# Patient Record
Sex: MALE | Race: WHITE | Employment: FULL TIME | URBAN - NONMETROPOLITAN AREA
[De-identification: names, ages, dates, MRNs, and addresses within clinical notes are randomized per-mention and may not be internally consistent; named-entity substitution may affect disease eponyms.]

---

## 2017-08-08 ENCOUNTER — OFFICE VISIT (OUTPATIENT)
Dept: FAMILY MEDICINE CLINIC | Age: 52
End: 2017-08-08
Payer: COMMERCIAL

## 2017-08-08 VITALS
BODY MASS INDEX: 31.34 KG/M2 | TEMPERATURE: 98 F | RESPIRATION RATE: 18 BRPM | HEART RATE: 74 BPM | DIASTOLIC BLOOD PRESSURE: 78 MMHG | WEIGHT: 195 LBS | SYSTOLIC BLOOD PRESSURE: 112 MMHG | OXYGEN SATURATION: 96 % | HEIGHT: 66 IN

## 2017-08-08 DIAGNOSIS — M48.02 CERVICAL SPINAL STENOSIS: ICD-10-CM

## 2017-08-08 DIAGNOSIS — E78.01 FAMILIAL HYPERCHOLESTEREMIA: ICD-10-CM

## 2017-08-08 DIAGNOSIS — I10 ESSENTIAL (PRIMARY) HYPERTENSION: Primary | ICD-10-CM

## 2017-08-08 DIAGNOSIS — Z00.00 ANNUAL PHYSICAL EXAM: ICD-10-CM

## 2017-08-08 DIAGNOSIS — M72.2 PLANTAR FASCIITIS, BILATERAL: ICD-10-CM

## 2017-08-08 PROCEDURE — 99213 OFFICE O/P EST LOW 20 MIN: CPT | Performed by: FAMILY MEDICINE

## 2017-08-08 RX ORDER — LISINOPRIL 10 MG/1
10 TABLET ORAL DAILY
COMMUNITY
End: 2018-03-15 | Stop reason: SDUPTHER

## 2017-08-08 RX ORDER — MELOXICAM 15 MG/1
15 TABLET ORAL DAILY
COMMUNITY
End: 2018-07-10 | Stop reason: SDUPTHER

## 2017-08-08 RX ORDER — PRAVASTATIN SODIUM 20 MG
20 TABLET ORAL DAILY
COMMUNITY
End: 2018-02-19 | Stop reason: ALTCHOICE

## 2017-08-15 PROBLEM — M72.2 PLANTAR FASCIITIS, BILATERAL: Status: ACTIVE | Noted: 2017-08-15

## 2017-08-15 ASSESSMENT — ENCOUNTER SYMPTOMS
CONSTIPATION: 0
SHORTNESS OF BREATH: 0
COUGH: 0
CHEST TIGHTNESS: 0
ABDOMINAL PAIN: 0
DIARRHEA: 0
NAUSEA: 0
ANAL BLEEDING: 0

## 2018-02-01 DIAGNOSIS — Z00.00 ANNUAL PHYSICAL EXAM: ICD-10-CM

## 2018-02-01 LAB
ALBUMIN SERPL-MCNC: 4.5 G/DL (ref 3.5–5.2)
ALP BLD-CCNC: 54 U/L (ref 40–130)
ALT SERPL-CCNC: 33 U/L (ref 5–41)
ANION GAP SERPL CALCULATED.3IONS-SCNC: 16 MMOL/L (ref 7–19)
AST SERPL-CCNC: 32 U/L (ref 5–40)
BILIRUB SERPL-MCNC: 0.4 MG/DL (ref 0.2–1.2)
BUN BLDV-MCNC: 11 MG/DL (ref 6–20)
CALCIUM SERPL-MCNC: 9.9 MG/DL (ref 8.6–10)
CHLORIDE BLD-SCNC: 100 MMOL/L (ref 98–111)
CHOLESTEROL, TOTAL: 254 MG/DL (ref 160–199)
CO2: 25 MMOL/L (ref 22–29)
CREAT SERPL-MCNC: 0.8 MG/DL (ref 0.5–1.2)
GFR NON-AFRICAN AMERICAN: >60
GLUCOSE BLD-MCNC: 129 MG/DL (ref 74–109)
HDLC SERPL-MCNC: 45 MG/DL (ref 55–121)
LDL CHOLESTEROL CALCULATED: 153 MG/DL
POTASSIUM SERPL-SCNC: 4.2 MMOL/L (ref 3.5–5)
PROSTATE SPECIFIC ANTIGEN: 0.6 NG/ML (ref 0–4)
SODIUM BLD-SCNC: 141 MMOL/L (ref 136–145)
TOTAL PROTEIN: 7.9 G/DL (ref 6.6–8.7)
TRIGL SERPL-MCNC: 278 MG/DL (ref 0–149)

## 2018-02-19 ENCOUNTER — OFFICE VISIT (OUTPATIENT)
Dept: FAMILY MEDICINE CLINIC | Age: 53
End: 2018-02-19
Payer: COMMERCIAL

## 2018-02-19 VITALS
SYSTOLIC BLOOD PRESSURE: 120 MMHG | HEIGHT: 66 IN | BODY MASS INDEX: 36.22 KG/M2 | HEART RATE: 92 BPM | OXYGEN SATURATION: 96 % | TEMPERATURE: 97.3 F | DIASTOLIC BLOOD PRESSURE: 68 MMHG | WEIGHT: 225.38 LBS

## 2018-02-19 DIAGNOSIS — B34.9 VIRAL ILLNESS: ICD-10-CM

## 2018-02-19 DIAGNOSIS — E78.01 FAMILIAL HYPERCHOLESTEREMIA: ICD-10-CM

## 2018-02-19 DIAGNOSIS — I10 ESSENTIAL (PRIMARY) HYPERTENSION: Primary | ICD-10-CM

## 2018-02-19 PROCEDURE — 99396 PREV VISIT EST AGE 40-64: CPT | Performed by: NURSE PRACTITIONER

## 2018-02-19 RX ORDER — ROSUVASTATIN CALCIUM 5 MG/1
5 TABLET, COATED ORAL DAILY
Qty: 30 TABLET | Refills: 5 | Status: SHIPPED | OUTPATIENT
Start: 2018-02-19 | End: 2019-04-12 | Stop reason: SDUPTHER

## 2018-02-19 RX ORDER — CHLORAL HYDRATE 500 MG
1000 CAPSULE ORAL DAILY
COMMUNITY
End: 2022-05-09 | Stop reason: ALTCHOICE

## 2018-02-19 ASSESSMENT — ENCOUNTER SYMPTOMS
ABDOMINAL PAIN: 0
SHORTNESS OF BREATH: 0
CHEST TIGHTNESS: 0
SORE THROAT: 0
NAUSEA: 0
DIARRHEA: 0
COUGH: 1
WHEEZING: 0

## 2018-02-19 ASSESSMENT — PATIENT HEALTH QUESTIONNAIRE - PHQ9
1. LITTLE INTEREST OR PLEASURE IN DOING THINGS: 0
SUM OF ALL RESPONSES TO PHQ QUESTIONS 1-9: 0
SUM OF ALL RESPONSES TO PHQ9 QUESTIONS 1 & 2: 0
2. FEELING DOWN, DEPRESSED OR HOPELESS: 0

## 2018-08-14 DIAGNOSIS — E78.01 FAMILIAL HYPERCHOLESTEREMIA: ICD-10-CM

## 2018-08-14 LAB
ALBUMIN SERPL-MCNC: 4.3 G/DL (ref 3.5–5.2)
ALP BLD-CCNC: 49 U/L (ref 40–130)
ALT SERPL-CCNC: 31 U/L (ref 5–41)
ANION GAP SERPL CALCULATED.3IONS-SCNC: 17 MMOL/L (ref 7–19)
AST SERPL-CCNC: 31 U/L (ref 5–40)
BILIRUB SERPL-MCNC: 0.6 MG/DL (ref 0.2–1.2)
BUN BLDV-MCNC: 11 MG/DL (ref 6–20)
CALCIUM SERPL-MCNC: 9.1 MG/DL (ref 8.6–10)
CHLORIDE BLD-SCNC: 101 MMOL/L (ref 98–111)
CHOLESTEROL, TOTAL: 166 MG/DL (ref 160–199)
CO2: 23 MMOL/L (ref 22–29)
CREAT SERPL-MCNC: 0.8 MG/DL (ref 0.5–1.2)
GFR NON-AFRICAN AMERICAN: >60
GLUCOSE BLD-MCNC: 91 MG/DL (ref 74–109)
HDLC SERPL-MCNC: 41 MG/DL (ref 55–121)
LDL CHOLESTEROL CALCULATED: 90 MG/DL
POTASSIUM SERPL-SCNC: 4 MMOL/L (ref 3.5–5)
SODIUM BLD-SCNC: 141 MMOL/L (ref 136–145)
TOTAL PROTEIN: 7.2 G/DL (ref 6.6–8.7)
TRIGL SERPL-MCNC: 177 MG/DL (ref 0–149)

## 2018-08-20 ENCOUNTER — OFFICE VISIT (OUTPATIENT)
Dept: FAMILY MEDICINE CLINIC | Age: 53
End: 2018-08-20
Payer: COMMERCIAL

## 2018-08-20 VITALS
HEART RATE: 72 BPM | SYSTOLIC BLOOD PRESSURE: 128 MMHG | TEMPERATURE: 98.1 F | BODY MASS INDEX: 35.91 KG/M2 | OXYGEN SATURATION: 98 % | WEIGHT: 222.5 LBS | RESPIRATION RATE: 18 BRPM | DIASTOLIC BLOOD PRESSURE: 80 MMHG

## 2018-08-20 DIAGNOSIS — Z12.11 ENCOUNTER FOR SCREENING COLONOSCOPY: ICD-10-CM

## 2018-08-20 DIAGNOSIS — Z12.5 ENCOUNTER FOR PROSTATE CANCER SCREENING: ICD-10-CM

## 2018-08-20 DIAGNOSIS — I10 ESSENTIAL (PRIMARY) HYPERTENSION: ICD-10-CM

## 2018-08-20 DIAGNOSIS — E78.01 FAMILIAL HYPERCHOLESTEREMIA: Primary | ICD-10-CM

## 2018-08-20 PROCEDURE — G8417 CALC BMI ABV UP PARAM F/U: HCPCS | Performed by: FAMILY MEDICINE

## 2018-08-20 PROCEDURE — 99213 OFFICE O/P EST LOW 20 MIN: CPT | Performed by: FAMILY MEDICINE

## 2018-08-20 PROCEDURE — 3017F COLORECTAL CA SCREEN DOC REV: CPT | Performed by: FAMILY MEDICINE

## 2018-08-20 PROCEDURE — 1036F TOBACCO NON-USER: CPT | Performed by: FAMILY MEDICINE

## 2018-08-20 PROCEDURE — G8427 DOCREV CUR MEDS BY ELIG CLIN: HCPCS | Performed by: FAMILY MEDICINE

## 2018-08-20 RX ORDER — HYDROCODONE BITARTRATE AND ACETAMINOPHEN 7.5; 325 MG/1; MG/1
TABLET ORAL
COMMUNITY
Start: 2018-08-13 | End: 2021-04-29 | Stop reason: SDUPTHER

## 2018-08-20 ASSESSMENT — PATIENT HEALTH QUESTIONNAIRE - PHQ9
2. FEELING DOWN, DEPRESSED OR HOPELESS: 0
SUM OF ALL RESPONSES TO PHQ QUESTIONS 1-9: 0
SUM OF ALL RESPONSES TO PHQ9 QUESTIONS 1 & 2: 0
1. LITTLE INTEREST OR PLEASURE IN DOING THINGS: 0
SUM OF ALL RESPONSES TO PHQ QUESTIONS 1-9: 0

## 2018-08-20 NOTE — PROGRESS NOTES
Discussed use, benefit, and side effects of prescribed medications. All patient questions answered. Pt voiced understanding. Reviewed health maintenance. Instructed to continue current medications, diet and exercise. Patient agreed with treatment plan. Follow up as directed.

## 2018-10-11 ENCOUNTER — TELEPHONE (OUTPATIENT)
Dept: GASTROENTEROLOGY | Age: 53
End: 2018-10-11

## 2019-02-08 DIAGNOSIS — I10 ESSENTIAL (PRIMARY) HYPERTENSION: ICD-10-CM

## 2019-02-08 DIAGNOSIS — E78.01 FAMILIAL HYPERCHOLESTEREMIA: ICD-10-CM

## 2019-02-08 DIAGNOSIS — Z12.5 ENCOUNTER FOR PROSTATE CANCER SCREENING: ICD-10-CM

## 2019-02-08 LAB
ALBUMIN SERPL-MCNC: 4.6 G/DL (ref 3.5–5.2)
ALP BLD-CCNC: 62 U/L (ref 40–130)
ALT SERPL-CCNC: 38 U/L (ref 5–41)
ANION GAP SERPL CALCULATED.3IONS-SCNC: 16 MMOL/L (ref 7–19)
AST SERPL-CCNC: 33 U/L (ref 5–40)
BILIRUB SERPL-MCNC: 0.4 MG/DL (ref 0.2–1.2)
BUN BLDV-MCNC: 11 MG/DL (ref 6–20)
CALCIUM SERPL-MCNC: 9.7 MG/DL (ref 8.6–10)
CHLORIDE BLD-SCNC: 104 MMOL/L (ref 98–111)
CHOLESTEROL, TOTAL: 167 MG/DL (ref 160–199)
CO2: 23 MMOL/L (ref 22–29)
CREAT SERPL-MCNC: 0.8 MG/DL (ref 0.5–1.2)
GFR NON-AFRICAN AMERICAN: >60
GLUCOSE BLD-MCNC: 109 MG/DL (ref 74–109)
HDLC SERPL-MCNC: 44 MG/DL (ref 55–121)
LDL CHOLESTEROL CALCULATED: 99 MG/DL
POTASSIUM SERPL-SCNC: 4.1 MMOL/L (ref 3.5–5)
PROSTATE SPECIFIC ANTIGEN: 1.58 NG/ML (ref 0–4)
SODIUM BLD-SCNC: 143 MMOL/L (ref 136–145)
TOTAL PROTEIN: 8 G/DL (ref 6.6–8.7)
TRIGL SERPL-MCNC: 119 MG/DL (ref 0–149)

## 2019-02-21 ENCOUNTER — OFFICE VISIT (OUTPATIENT)
Dept: FAMILY MEDICINE CLINIC | Age: 54
End: 2019-02-21
Payer: COMMERCIAL

## 2019-02-21 VITALS
HEIGHT: 67 IN | HEART RATE: 68 BPM | OXYGEN SATURATION: 98 % | WEIGHT: 232 LBS | BODY MASS INDEX: 36.41 KG/M2 | SYSTOLIC BLOOD PRESSURE: 130 MMHG | DIASTOLIC BLOOD PRESSURE: 72 MMHG | TEMPERATURE: 99 F

## 2019-02-21 DIAGNOSIS — E78.01 FAMILIAL HYPERCHOLESTEROLEMIA: ICD-10-CM

## 2019-02-21 DIAGNOSIS — I10 ESSENTIAL (PRIMARY) HYPERTENSION: ICD-10-CM

## 2019-02-21 DIAGNOSIS — Z00.00 ANNUAL PHYSICAL EXAM: Primary | ICD-10-CM

## 2019-02-21 PROCEDURE — G8484 FLU IMMUNIZE NO ADMIN: HCPCS | Performed by: FAMILY MEDICINE

## 2019-02-21 PROCEDURE — 99396 PREV VISIT EST AGE 40-64: CPT | Performed by: FAMILY MEDICINE

## 2019-02-21 ASSESSMENT — ENCOUNTER SYMPTOMS
NAUSEA: 0
ANAL BLEEDING: 0
CONSTIPATION: 0
CHEST TIGHTNESS: 0
COUGH: 0
SHORTNESS OF BREATH: 0
ABDOMINAL PAIN: 0
DIARRHEA: 0

## 2019-04-12 RX ORDER — ROSUVASTATIN CALCIUM 5 MG/1
TABLET, COATED ORAL
Qty: 30 TABLET | Refills: 5 | Status: SHIPPED | OUTPATIENT
Start: 2019-04-12 | End: 2019-07-22 | Stop reason: SDUPTHER

## 2019-04-12 RX ORDER — LISINOPRIL 10 MG/1
TABLET ORAL
Qty: 90 TABLET | Refills: 0 | Status: SHIPPED | OUTPATIENT
Start: 2019-04-12 | End: 2019-07-22 | Stop reason: SDUPTHER

## 2019-06-18 ENCOUNTER — ANESTHESIA EVENT (OUTPATIENT)
Dept: OPERATING ROOM | Age: 54
End: 2019-06-18

## 2019-06-19 ENCOUNTER — ANESTHESIA (OUTPATIENT)
Dept: OPERATING ROOM | Age: 54
End: 2019-06-19

## 2019-06-19 ENCOUNTER — APPOINTMENT (OUTPATIENT)
Dept: OPERATING ROOM | Age: 54
End: 2019-06-19

## 2019-06-19 ENCOUNTER — HOSPITAL ENCOUNTER (OUTPATIENT)
Age: 54
Setting detail: OUTPATIENT SURGERY
Discharge: HOME | End: 2019-06-19
Attending: INTERNAL MEDICINE | Admitting: INTERNAL MEDICINE
Payer: COMMERCIAL

## 2019-06-19 VITALS — DIASTOLIC BLOOD PRESSURE: 95 MMHG | OXYGEN SATURATION: 97 % | SYSTOLIC BLOOD PRESSURE: 129 MMHG

## 2019-06-19 VITALS
BODY MASS INDEX: 36.96 KG/M2 | OXYGEN SATURATION: 98 % | WEIGHT: 230 LBS | HEART RATE: 79 BPM | SYSTOLIC BLOOD PRESSURE: 128 MMHG | RESPIRATION RATE: 18 BRPM | DIASTOLIC BLOOD PRESSURE: 74 MMHG | TEMPERATURE: 98.1 F | HEIGHT: 66 IN

## 2019-06-19 PROCEDURE — G8918 PT W/O PREOP ORDER IV AB PRO: HCPCS

## 2019-06-19 PROCEDURE — 45378 DIAGNOSTIC COLONOSCOPY: CPT | Performed by: INTERNAL MEDICINE

## 2019-06-19 PROCEDURE — G0105 COLORECTAL SCRN; HI RISK IND: HCPCS

## 2019-06-19 PROCEDURE — G8907 PT DOC NO EVENTS ON DISCHARG: HCPCS

## 2019-06-19 RX ORDER — MIDAZOLAM HYDROCHLORIDE 1 MG/ML
INJECTION INTRAMUSCULAR; INTRAVENOUS PRN
Status: DISCONTINUED | OUTPATIENT
Start: 2019-06-19 | End: 2019-06-19 | Stop reason: SDUPTHER

## 2019-06-19 RX ORDER — SODIUM CHLORIDE 9 MG/ML
INJECTION, SOLUTION INTRAVENOUS CONTINUOUS PRN
Status: DISCONTINUED | OUTPATIENT
Start: 2019-06-19 | End: 2019-06-19 | Stop reason: SDUPTHER

## 2019-06-19 RX ORDER — PROPOFOL 10 MG/ML
INJECTION, EMULSION INTRAVENOUS PRN
Status: DISCONTINUED | OUTPATIENT
Start: 2019-06-19 | End: 2019-06-19 | Stop reason: SDUPTHER

## 2019-06-19 RX ORDER — LIDOCAINE HYDROCHLORIDE 10 MG/ML
INJECTION, SOLUTION INFILTRATION; PERINEURAL PRN
Status: DISCONTINUED | OUTPATIENT
Start: 2019-06-19 | End: 2019-06-19 | Stop reason: SDUPTHER

## 2019-06-19 RX ORDER — SODIUM CHLORIDE 9 MG/ML
INJECTION, SOLUTION INTRAVENOUS CONTINUOUS
Status: DISCONTINUED | OUTPATIENT
Start: 2019-06-19 | End: 2019-06-19 | Stop reason: HOSPADM

## 2019-06-19 RX ADMIN — PROPOFOL 250 MG: 10 INJECTION, EMULSION INTRAVENOUS at 09:46

## 2019-06-19 RX ADMIN — SODIUM CHLORIDE: 9 INJECTION, SOLUTION INTRAVENOUS at 09:27

## 2019-06-19 RX ADMIN — MIDAZOLAM HYDROCHLORIDE 2 MG: 1 INJECTION INTRAMUSCULAR; INTRAVENOUS at 09:46

## 2019-06-19 RX ADMIN — LIDOCAINE HYDROCHLORIDE 40 MG: 10 INJECTION, SOLUTION INFILTRATION; PERINEURAL at 09:46

## 2019-06-19 RX ADMIN — SODIUM CHLORIDE: 9 INJECTION, SOLUTION INTRAVENOUS at 09:39

## 2019-06-19 ASSESSMENT — LIFESTYLE VARIABLES: SMOKING_STATUS: 0

## 2019-06-19 ASSESSMENT — PAIN - FUNCTIONAL ASSESSMENT: PAIN_FUNCTIONAL_ASSESSMENT: 0-10

## 2019-06-19 NOTE — ANESTHESIA POSTPROCEDURE EVALUATION
Department of Anesthesiology  Postprocedure Note    Patient: Sahil Dominguez  MRN: [id number]  YOB: 1965  Date of evaluation: 6/19/2019  Time:  10:00 AM     Procedure Summary     Date:  06/19/19 Room / Location:  [facility] ENDO 01 / [facility] OR    Anesthesia Start:  5276 Anesthesia Stop:      Procedure:  COLORECTAL CANCER SCREENING, NOT HIGH RISK (N/A Abdomen) Diagnosis:  (SCREEN - FH CLN POLYPS)    Surgeon:  Cory Molina MD Responsible Provider:  TESSY Choudhury CRNA    Anesthesia Type:  general, TIVA ASA Status:  2          Anesthesia Type: No value filed. Maryann Phase I:      Maryann Phase II:      Last vitals: Reviewed and per EMR flowsheets.        Anesthesia Post Evaluation    Patient location during evaluation: bedside  Patient participation: complete - patient participated  Level of consciousness: sleepy but conscious  Pain score: 0  Airway patency: patent  Nausea & Vomiting: no nausea and no vomiting  Complications: no  Cardiovascular status: hemodynamically stable and blood pressure returned to baseline  Respiratory status: acceptable and nasal cannula  Hydration status: stable

## 2019-06-19 NOTE — H&P
Patient Name: Will Mackey  : 1965  MRN: [id number]  DATE: 19    Allergies: No Known Allergies     ENDOSCOPY  History and Physical    Procedure:    [] Diagnostic Colonoscopy       [x] Screening Colonoscopy  [] EGD      [] ERCP      [] EUS       [] Other    [x] Previous office notes/History and Physical reviewed from the patients chart. Please see EMR for further details of HPI. I have examined the patient's status immediately prior to the procedure and:      Indications/HPI:    []Abdominal Pain   []Cancer- GI/Lung     []Fhx of colon CA/polyps  []History of Polyps  []Barretts            []Melena  []Abnormal Imaging              []Dysphagia              []Persistent Pneumonia   []Anemia                            []Food Impaction        []History of Polyps  [] GI Bleed             []Pulmonary nodule/Mass   []Change in bowel habits []Heartburn/Reflux  []Rectal Bleed (BRBPR)  []Chest Pain - Non Cardiac []Heme (+) Stool []Ulcers  []Constipation  []Hemoptysis  []Varices  []Diarrhea  []Hypoxemia    []Nausea/Vomiting   [x]Screening   []Crohns/Colitis  []Other:     Anesthesia:   [x] MAC [] Moderate Sedation   [] General   [] None     ROS: 12 pt Review of Symptoms was negative unless mentioned above    Medications:   Prior to Admission medications    Medication Sig Start Date End Date Taking?  Authorizing Provider   lisinopril (PRINIVIL;ZESTRIL) 10 MG tablet TAKE (1) TABLET DAILY FOR HIGH BLOOD PRESSURE. 19  Yes Lor Mistry MD   rosuvastatin (CRESTOR) 5 MG tablet TAKE 1 TABLET BY MOUTH ONCE DAILY 19  Yes Jewel Bilberry, APRN   meloxicam (MOBIC) 15 MG tablet TAKE 1 TABLET BY MOUTH ONCE DAILY 18  Yes Lor Mistry MD   HYDROcodone-acetaminophen [facility]) 7.5-325 MG per tablet  18   Historical Provider, MD   Omega-3 Fatty Acids (FISH OIL) 1000 MG CAPS Take 1,000 mg by mouth daily    Historical Provider, MD       Past Medical History:  Past Medical History:   Diagnosis Date  Hypertension        Past Surgical History:  Past Surgical History:   Procedure Laterality Date    FOOT NEUROMA SURGERY Left 2013       Social History:  Social History     Tobacco Use    Smoking status: Never Smoker    Smokeless tobacco: Never Used   Substance Use Topics    Alcohol use: Yes     Comment: social    Drug use: No       Vital Signs:   Vitals:    06/19/19 0903   BP: 119/77   Pulse: 84   Resp: 20   Temp: 98.1 °F (36.7 °C)   SpO2: 97%        Physical Exam:  Cardiac:  [x]WNL  []Comments:  Pulmonary:  [x]WNL   []Comments:  Neuro/Mental Status:  [x]WNL  []Comments:  Abdominal:  [x]WNL    []Comments:  Other:   []WNL  []Comments:    Informed Consent:  The risks and benefits of the procedure have been discussed with either the patient or if they cannot consent, their representative. Assessment:  Patient examined and appropriate for planned sedation and procedure. Plan:  Proceed with planned sedation and procedure as above.          Roddy Saleh MD

## 2019-06-19 NOTE — ANESTHESIA PRE PROCEDURE
Department of Anesthesiology  Preprocedure Note       Name:  Demetra Lundborg   Age:  48 y.o.  :  1965                                          MRN:  [id number]         Date:  2019      Surgeon: Sherry Valladares):  Sandra Garcia MD    Procedure: COLORECTAL CANCER SCREENING, NOT HIGH RISK (N/A Abdomen)    Medications prior to admission:   Prior to Admission medications    Medication Sig Start Date End Date Taking?  Authorizing Provider   lisinopril (PRINIVIL;ZESTRIL) 10 MG tablet TAKE (1) TABLET DAILY FOR HIGH BLOOD PRESSURE. 19  Yes Yovany Salgado MD   rosuvastatin (CRESTOR) 5 MG tablet TAKE 1 TABLET BY MOUTH ONCE DAILY 19  Yes TESSY Paulino   meloxicam (MOBIC) 15 MG tablet TAKE 1 TABLET BY MOUTH ONCE DAILY 18  Yes Yovany Salgado MD   HYDROcodone-acetaminophen [facility]) 7.5-325 MG per tablet  18   Historical Provider, MD   Omega-3 Fatty Acids (FISH OIL) 1000 MG CAPS Take 1,000 mg by mouth daily    Historical Provider, MD       Current medications:    Current Facility-Administered Medications   Medication Dose Route Frequency Provider Last Rate Last Dose    0.9 % sodium chloride infusion   Intravenous Continuous Sandra Garcia  mL/hr at 19 9602         Allergies:  No Known Allergies    Problem List:    Patient Active Problem List   Diagnosis Code    Essential (primary) hypertension I10    Familial hypercholesteremia E78.01    Cervical spinal stenosis M48.02    Plantar fasciitis, bilateral M72.2       Past Medical History:        Diagnosis Date    Hypertension        Past Surgical History:        Procedure Laterality Date    FOOT NEUROMA SURGERY Left        Social History:    Social History     Tobacco Use    Smoking status: Never Smoker    Smokeless tobacco: Never Used   Substance Use Topics    Alcohol use: Yes     Comment: social                                Counseling given: Not Answered      Vital Signs (Current):   Vitals: 06/19/19 0903   BP: 119/77   Pulse: 84   Resp: 20   Temp: 98.1 °F (36.7 °C)   TempSrc: Tympanic   SpO2: 97%   Weight: 230 lb (104.3 kg)   Height: 5' 6\" (1.676 m)                                              BP Readings from Last 3 Encounters:   06/19/19 119/77   02/21/19 130/72   08/20/18 128/80       NPO Status: Time of last liquid consumption: 0830(pill with sip of h20)                        Time of last solid consumption: 1800                        Date of last liquid consumption: 06/19/19                        Date of last solid food consumption: 06/17/19    BMI:   Wt Readings from Last 3 Encounters:   06/19/19 230 lb (104.3 kg)   02/21/19 232 lb (105.2 kg)   08/20/18 222 lb 8 oz (100.9 kg)     Body mass index is 37.12 kg/m². CBC: No results found for: WBC, RBC, HGB, HCT, MCV, RDW, PLT    CMP:   Lab Results   Component Value Date     02/08/2019    K 4.1 02/08/2019     02/08/2019    CO2 23 02/08/2019    BUN 11 02/08/2019    CREATININE 0.8 02/08/2019    LABGLOM >60 02/08/2019    GLUCOSE 109 02/08/2019    PROT 8.0 02/08/2019    CALCIUM 9.7 02/08/2019    BILITOT 0.4 02/08/2019    ALKPHOS 62 02/08/2019    AST 33 02/08/2019    ALT 38 02/08/2019       POC Tests: No results for input(s): POCGLU, POCNA, POCK, POCCL, POCBUN, POCHEMO, POCHCT in the last 72 hours.     Coags: No results found for: PROTIME, INR, APTT    HCG (If Applicable): No results found for: PREGTESTUR, PREGSERUM, HCG, HCGQUANT     ABGs: No results found for: PHART, PO2ART, GXQ9JJH, VBL2BCL, BEART, T9REUNKY     Type & Screen (If Applicable):  No results found for: [facility]    Anesthesia Evaluation  Patient summary reviewed and Nursing notes reviewed no history of anesthetic complications:   Airway: Mallampati: II  TM distance: <3 FB   Neck ROM: full  Mouth opening: < 3 FB Dental: normal exam         Pulmonary:normal exam        (-) not a current smoker                           Cardiovascular:    (+) hypertension:,          Beta Blocker:  Not on Beta Blocker         Neuro/Psych:   Negative Neuro/Psych ROS              GI/Hepatic/Renal:   (+) bowel prep,          ROS comment: BMI 37  etoh weekends. Endo/Other: Negative Endo/Other ROS                    Abdominal:           Vascular: negative vascular ROS. Anesthesia Plan      general and TIVA     ASA 2       Induction: intravenous. Anesthetic plan and risks discussed with patient.                       TESSY Gutiérrez - CRNA   6/19/2019

## 2019-07-22 RX ORDER — LISINOPRIL 10 MG/1
TABLET ORAL
Qty: 90 TABLET | Refills: 0 | Status: SHIPPED | OUTPATIENT
Start: 2019-07-22 | End: 2019-09-27 | Stop reason: SDUPTHER

## 2019-07-22 RX ORDER — MELOXICAM 15 MG/1
TABLET ORAL
Qty: 30 TABLET | Refills: 0 | Status: SHIPPED | OUTPATIENT
Start: 2019-07-22 | End: 2019-09-27 | Stop reason: SDUPTHER

## 2019-08-16 ENCOUNTER — TELEPHONE (OUTPATIENT)
Dept: FAMILY MEDICINE CLINIC | Age: 54
End: 2019-08-16

## 2019-09-27 ENCOUNTER — OFFICE VISIT (OUTPATIENT)
Dept: FAMILY MEDICINE CLINIC | Age: 54
End: 2019-09-27
Payer: COMMERCIAL

## 2019-09-27 VITALS
BODY MASS INDEX: 36.8 KG/M2 | WEIGHT: 228 LBS | SYSTOLIC BLOOD PRESSURE: 106 MMHG | HEART RATE: 85 BPM | TEMPERATURE: 97.7 F | OXYGEN SATURATION: 98 % | DIASTOLIC BLOOD PRESSURE: 72 MMHG

## 2019-09-27 DIAGNOSIS — Z23 NEED FOR INFLUENZA VACCINATION: ICD-10-CM

## 2019-09-27 DIAGNOSIS — E78.01 FAMILIAL HYPERCHOLESTEROLEMIA: ICD-10-CM

## 2019-09-27 DIAGNOSIS — Z12.5 ENCOUNTER FOR PROSTATE CANCER SCREENING: ICD-10-CM

## 2019-09-27 DIAGNOSIS — I10 ESSENTIAL (PRIMARY) HYPERTENSION: Primary | ICD-10-CM

## 2019-09-27 PROCEDURE — 1036F TOBACCO NON-USER: CPT | Performed by: FAMILY MEDICINE

## 2019-09-27 PROCEDURE — G8427 DOCREV CUR MEDS BY ELIG CLIN: HCPCS | Performed by: FAMILY MEDICINE

## 2019-09-27 PROCEDURE — 3017F COLORECTAL CA SCREEN DOC REV: CPT | Performed by: FAMILY MEDICINE

## 2019-09-27 PROCEDURE — 99213 OFFICE O/P EST LOW 20 MIN: CPT | Performed by: FAMILY MEDICINE

## 2019-09-27 PROCEDURE — G8417 CALC BMI ABV UP PARAM F/U: HCPCS | Performed by: FAMILY MEDICINE

## 2019-09-27 RX ORDER — MELOXICAM 15 MG/1
TABLET ORAL
Qty: 30 TABLET | Refills: 1 | Status: SHIPPED | OUTPATIENT
Start: 2019-09-27 | End: 2020-06-18

## 2019-09-27 RX ORDER — LISINOPRIL 10 MG/1
TABLET ORAL
Qty: 90 TABLET | Refills: 3 | Status: SHIPPED | OUTPATIENT
Start: 2019-09-27 | End: 2020-11-06

## 2020-06-18 RX ORDER — MELOXICAM 15 MG/1
TABLET ORAL
Qty: 30 TABLET | Refills: 0 | Status: SHIPPED | OUTPATIENT
Start: 2020-06-18 | End: 2020-09-14

## 2020-07-06 ENCOUNTER — OFFICE VISIT (OUTPATIENT)
Dept: FAMILY MEDICINE CLINIC | Age: 55
End: 2020-07-06
Payer: COMMERCIAL

## 2020-07-06 VITALS
OXYGEN SATURATION: 98 % | WEIGHT: 210.6 LBS | DIASTOLIC BLOOD PRESSURE: 72 MMHG | BODY MASS INDEX: 33.06 KG/M2 | TEMPERATURE: 97.5 F | SYSTOLIC BLOOD PRESSURE: 126 MMHG | HEIGHT: 67 IN | HEART RATE: 82 BPM

## 2020-07-06 DIAGNOSIS — E78.01 FAMILIAL HYPERCHOLESTEROLEMIA: ICD-10-CM

## 2020-07-06 DIAGNOSIS — Z12.5 ENCOUNTER FOR PROSTATE CANCER SCREENING: ICD-10-CM

## 2020-07-06 DIAGNOSIS — I10 ESSENTIAL (PRIMARY) HYPERTENSION: ICD-10-CM

## 2020-07-06 LAB
ALBUMIN SERPL-MCNC: 4.5 G/DL (ref 3.5–5.2)
ALP BLD-CCNC: 65 U/L (ref 40–130)
ALT SERPL-CCNC: 23 U/L (ref 5–41)
ANION GAP SERPL CALCULATED.3IONS-SCNC: 12 MMOL/L (ref 7–19)
AST SERPL-CCNC: 26 U/L (ref 5–40)
BASOPHILS ABSOLUTE: 0 K/UL (ref 0–0.2)
BASOPHILS RELATIVE PERCENT: 0.6 % (ref 0–1)
BILIRUB SERPL-MCNC: <0.2 MG/DL (ref 0.2–1.2)
BUN BLDV-MCNC: 14 MG/DL (ref 6–20)
CALCIUM SERPL-MCNC: 9.4 MG/DL (ref 8.6–10)
CHLORIDE BLD-SCNC: 103 MMOL/L (ref 98–111)
CHOLESTEROL, TOTAL: 143 MG/DL (ref 160–199)
CO2: 24 MMOL/L (ref 22–29)
CREAT SERPL-MCNC: 0.8 MG/DL (ref 0.5–1.2)
EOSINOPHILS ABSOLUTE: 0.3 K/UL (ref 0–0.6)
EOSINOPHILS RELATIVE PERCENT: 5.1 % (ref 0–5)
GFR NON-AFRICAN AMERICAN: >60
GLUCOSE BLD-MCNC: 111 MG/DL (ref 74–109)
HCT VFR BLD CALC: 47 % (ref 42–52)
HDLC SERPL-MCNC: 41 MG/DL (ref 55–121)
HEMOGLOBIN: 15.5 G/DL (ref 14–18)
IMMATURE GRANULOCYTES #: 0 K/UL
LDL CHOLESTEROL CALCULATED: 68 MG/DL
LYMPHOCYTES ABSOLUTE: 2.7 K/UL (ref 1.1–4.5)
LYMPHOCYTES RELATIVE PERCENT: 40.2 % (ref 20–40)
MCH RBC QN AUTO: 31.1 PG (ref 27–31)
MCHC RBC AUTO-ENTMCNC: 33 G/DL (ref 33–37)
MCV RBC AUTO: 94.2 FL (ref 80–94)
MONOCYTES ABSOLUTE: 0.6 K/UL (ref 0–0.9)
MONOCYTES RELATIVE PERCENT: 9.1 % (ref 0–10)
NEUTROPHILS ABSOLUTE: 3 K/UL (ref 1.5–7.5)
NEUTROPHILS RELATIVE PERCENT: 44.9 % (ref 50–65)
PDW BLD-RTO: 12.4 % (ref 11.5–14.5)
PLATELET # BLD: 250 K/UL (ref 130–400)
PMV BLD AUTO: 10.3 FL (ref 9.4–12.4)
POTASSIUM SERPL-SCNC: 4.5 MMOL/L (ref 3.5–5)
PROSTATE SPECIFIC ANTIGEN: 0.69 NG/ML (ref 0–4)
RBC # BLD: 4.99 M/UL (ref 4.7–6.1)
SODIUM BLD-SCNC: 139 MMOL/L (ref 136–145)
TOTAL PROTEIN: 7.3 G/DL (ref 6.6–8.7)
TRIGL SERPL-MCNC: 172 MG/DL (ref 0–149)
WBC # BLD: 6.7 K/UL (ref 4.8–10.8)

## 2020-07-06 PROCEDURE — 99396 PREV VISIT EST AGE 40-64: CPT | Performed by: FAMILY MEDICINE

## 2020-07-06 ASSESSMENT — PATIENT HEALTH QUESTIONNAIRE - PHQ9
SUM OF ALL RESPONSES TO PHQ QUESTIONS 1-9: 0
SUM OF ALL RESPONSES TO PHQ QUESTIONS 1-9: 0
2. FEELING DOWN, DEPRESSED OR HOPELESS: 0
1. LITTLE INTEREST OR PLEASURE IN DOING THINGS: 0
SUM OF ALL RESPONSES TO PHQ9 QUESTIONS 1 & 2: 0

## 2020-07-06 NOTE — PROGRESS NOTES
02/24/2017    Flu vaccine (1) 09/01/2020    Lipid screen  07/06/2021    Potassium monitoring  07/06/2021    Creatinine monitoring  07/06/2021    DTaP/Tdap/Td vaccine (2 - Td) 03/15/2023    Colon cancer screen colonoscopy  06/19/2029    Hepatitis A vaccine  Aged Out    Hepatitis B vaccine  Aged Out    Hib vaccine  Aged Out    Meningococcal (ACWY) vaccine  Aged Out    Pneumococcal 0-64 years Vaccine  Aged Out       Subjective:      Review of Systems  Review of Systems   Constitutional: Negative for chills and fever. HENT: Negative for congestion. Respiratory: Negative for cough, chest tightness and shortness of breath. Cardiovascular: Negative for chest pain, palpitations and leg swelling. Gastrointestinal: Negative for abdominal pain, anal bleeding, constipation, diarrhea and nausea. Genitourinary: Negative for difficulty urinating. Psychiatric/Behavioral: Negative. SeeHPI for visit specific review of symptoms. All others negative      Objective:   /72   Pulse 82   Temp 97.5 °F (36.4 °C)   Ht 5' 7\" (1.702 m)   Wt 210 lb 9.6 oz (95.5 kg)   SpO2 98%   BMI 32.98 kg/m²   Physical Exam  Physical Exam   Constitutional: He appears well-developed. Does not appear ill. Eyes: Pupils are equal, round, and reactive to light. Conjunctiva and Lids normal.  Neck: Normal range of motion. Neck supple. No masses. Neck Symmetric. Normal tracheal position. No thyroid enlargement  Cardiovascular: Normal rate and regular rhythm. Exam reveals no friction rub. Carotid arteries: no bruit observed. No murmur heard. Respiratory:  Effort normal and breath sounds normal. No respiratory distress. No wheezes. No rales. No use of accessory muscles or intercostal retractions. Abdominal: Soft. Bowel sounds are normal. exhibits no distension. There is no tenderness. There is no rebound and no guarding. Musculoskeletal: exhibits no edema. Normal gait. Neurological: alert.    Psychiatric: normal mood and affect. His behavior is normal. Normal judgement and insight observed.       Recent Results (from the past 672 hour(s))   CBC Auto Differential    Collection Time: 07/06/20  7:34 AM   Result Value Ref Range    WBC 6.7 4.8 - 10.8 K/uL    RBC 4.99 4.70 - 6.10 M/uL    Hemoglobin 15.5 14.0 - 18.0 g/dL    Hematocrit 47.0 42.0 - 52.0 %    MCV 94.2 (H) 80.0 - 94.0 fL    MCH 31.1 (H) 27.0 - 31.0 pg    MCHC 33.0 33.0 - 37.0 g/dL    RDW 12.4 11.5 - 14.5 %    Platelets 470 252 - 423 K/uL    MPV 10.3 9.4 - 12.4 fL    Neutrophils % 44.9 (L) 50.0 - 65.0 %    Lymphocytes % 40.2 (H) 20.0 - 40.0 %    Monocytes % 9.1 0.0 - 10.0 %    Eosinophils % 5.1 (H) 0.0 - 5.0 %    Basophils % 0.6 0.0 - 1.0 %    Neutrophils Absolute 3.0 1.5 - 7.5 K/uL    Immature Granulocytes # 0.0 K/uL    Lymphocytes Absolute 2.7 1.1 - 4.5 K/uL    Monocytes Absolute 0.60 0.00 - 0.90 K/uL    Eosinophils Absolute 0.30 0.00 - 0.60 K/uL    Basophils Absolute 0.00 0.00 - 0.20 K/uL   Lipid Panel    Collection Time: 07/06/20  7:34 AM   Result Value Ref Range    Cholesterol, Total 143 (L) 160 - 199 mg/dL    Triglycerides 172 (H) 0 - 149 mg/dL    HDL 41 (L) 55 - 121 mg/dL    LDL Calculated 68 <100 mg/dL   Comprehensive Metabolic Panel    Collection Time: 07/06/20  7:34 AM   Result Value Ref Range    Sodium 139 136 - 145 mmol/L    Potassium 4.5 3.5 - 5.0 mmol/L    Chloride 103 98 - 111 mmol/L    CO2 24 22 - 29 mmol/L    Anion Gap 12 7 - 19 mmol/L    Glucose 111 (H) 74 - 109 mg/dL    BUN 14 6 - 20 mg/dL    CREATININE 0.8 0.5 - 1.2 mg/dL    GFR Non-African American >60 >60    Calcium 9.4 8.6 - 10.0 mg/dL    Total Protein 7.3 6.6 - 8.7 g/dL    Alb 4.5 3.5 - 5.2 g/dL    Total Bilirubin <0.2 0.2 - 1.2 mg/dL    Alkaline Phosphatase 65 40 - 130 U/L    ALT 23 5 - 41 U/L    AST 26 5 - 40 U/L   Psa screening    Collection Time: 07/06/20  7:34 AM   Result Value Ref Range    PSA 0.69 0.00 - 4.00 ng/mL     Lab Results   Component Value Date    CHOL 143 (L) 07/06/2020    CHOL 167 02/08/2019    CHOL 166 08/14/2018     Lab Results   Component Value Date    TRIG 172 (H) 07/06/2020    TRIG 119 02/08/2019    TRIG 177 (H) 08/14/2018     Lab Results   Component Value Date    HDL 41 (L) 07/06/2020    HDL 44 (L) 02/08/2019    HDL 41 (L) 08/14/2018     Lab Results   Component Value Date    LDLCALC 68 07/06/2020    [address] 99 02/08/2019 [address] [employer] 90 08/14/2018     No results found for: LABVLDL, VLDL  No results found for: CHOLHDLRATIO             Assessment & Plan: The following diagnoses and conditions are stable with no further orders unless indicated:  1. Annual physical exam  Discussed lifestyle changes such as diet and exercise. Recommended eliminate processed food from diet such as sugar and fried foods. Recommended exercising at least 150 minutes/week. Try to do full body resistance training twice a week as well. Offered suggestions for calorie counting such as phone apps and online resources such as My fitness pal and Lose it. Discussed healthy weight. 2. Familial hypercholesterolemia  Lab Results   Component Value Date    CHOL 143 (L) 07/06/2020    CHOL 167 02/08/2019    CHOL 166 08/14/2018     Lab Results   Component Value Date    TRIG 172 (H) 07/06/2020    TRIG 119 02/08/2019    TRIG 177 (H) 08/14/2018     Lab Results   Component Value Date    HDL 41 (L) 07/06/2020    HDL 44 (L) 02/08/2019    HDL 41 (L) 08/14/2018     Lab Results   Component Value Date    LDLCALC 68 07/06/2020 [address] [employer] 99 02/08/2019 [address] [employer] [address] 08/14/2018     No results found for: LABVLDL, VLDL  No results found for: CHOLHDLRATIO  Cholesterol improved. Continue current medication  - Lipid Panel; Future  - Comprehensive Metabolic Panel; Future    3.  Essential (primary) hypertension  BP Readings from Last 3 Encounters:   07/06/20 126/72   09/27/19 106/72   06/19/19 (!) 129/95     Stable continue current medication            Return in about 3 months (around 10/6/2020) for Routine follow up - 15 minute visit. Discussed use, benefit, and side effects of prescribed medications. All patient questions answered. Pt voiced understanding. Reviewed health maintenance. Instructedto continue current medications, diet and exercise. Patient agreed with treatmentplan. Follow up as directed.        Note dictated using [id number] [place] [employer]

## 2020-09-14 RX ORDER — MELOXICAM 15 MG/1
TABLET ORAL
Qty: 30 TABLET | Refills: 5 | Status: SHIPPED | OUTPATIENT
Start: 2020-09-14 | End: 2021-10-11

## 2020-09-29 DIAGNOSIS — E78.01 FAMILIAL HYPERCHOLESTEROLEMIA: ICD-10-CM

## 2020-09-29 LAB
ALBUMIN SERPL-MCNC: 4.4 G/DL (ref 3.5–5.2)
ALP BLD-CCNC: 71 U/L (ref 40–130)
ALT SERPL-CCNC: 23 U/L (ref 5–41)
ANION GAP SERPL CALCULATED.3IONS-SCNC: 14 MMOL/L (ref 7–19)
AST SERPL-CCNC: 26 U/L (ref 5–40)
BILIRUB SERPL-MCNC: 0.4 MG/DL (ref 0.2–1.2)
BUN BLDV-MCNC: 10 MG/DL (ref 6–20)
CALCIUM SERPL-MCNC: 9.4 MG/DL (ref 8.6–10)
CHLORIDE BLD-SCNC: 104 MMOL/L (ref 98–111)
CHOLESTEROL, TOTAL: 193 MG/DL (ref 160–199)
CO2: 24 MMOL/L (ref 22–29)
CREAT SERPL-MCNC: 0.8 MG/DL (ref 0.5–1.2)
GFR AFRICAN AMERICAN: >59
GFR NON-AFRICAN AMERICAN: >60
GLUCOSE BLD-MCNC: 123 MG/DL (ref 74–109)
HDLC SERPL-MCNC: 45 MG/DL (ref 55–121)
LDL CHOLESTEROL CALCULATED: 123 MG/DL
POTASSIUM SERPL-SCNC: 4.5 MMOL/L (ref 3.5–5)
SODIUM BLD-SCNC: 142 MMOL/L (ref 136–145)
TOTAL PROTEIN: 7.7 G/DL (ref 6.6–8.7)
TRIGL SERPL-MCNC: 126 MG/DL (ref 0–149)

## 2020-10-05 ENCOUNTER — OFFICE VISIT (OUTPATIENT)
Dept: FAMILY MEDICINE CLINIC | Age: 55
End: 2020-10-05
Payer: COMMERCIAL

## 2020-10-05 VITALS
HEART RATE: 83 BPM | SYSTOLIC BLOOD PRESSURE: 128 MMHG | TEMPERATURE: 97.4 F | WEIGHT: 209 LBS | DIASTOLIC BLOOD PRESSURE: 74 MMHG | OXYGEN SATURATION: 99 % | BODY MASS INDEX: 32.73 KG/M2

## 2020-10-05 PROCEDURE — G8417 CALC BMI ABV UP PARAM F/U: HCPCS | Performed by: FAMILY MEDICINE

## 2020-10-05 PROCEDURE — G8427 DOCREV CUR MEDS BY ELIG CLIN: HCPCS | Performed by: FAMILY MEDICINE

## 2020-10-05 PROCEDURE — G8484 FLU IMMUNIZE NO ADMIN: HCPCS | Performed by: FAMILY MEDICINE

## 2020-10-05 PROCEDURE — 99214 OFFICE O/P EST MOD 30 MIN: CPT | Performed by: FAMILY MEDICINE

## 2020-10-05 PROCEDURE — 1036F TOBACCO NON-USER: CPT | Performed by: FAMILY MEDICINE

## 2020-10-05 PROCEDURE — 3017F COLORECTAL CA SCREEN DOC REV: CPT | Performed by: FAMILY MEDICINE

## 2020-10-05 NOTE — PROGRESS NOTES
[facility]  [facility]  [id number] [place] [address]  [facility] [place] [address]  Dept: [phone]  Dept Fax: [phone]  Loc: [phone]    Krystina Mccord is a 50 y.o. male who presents today for his medical conditions/complaints as noted below. Krystina Mccord is here for 3 Month Follow-Up        HPI:   CC: Here today to discuss the following:    Hyperlipidemia  He felt his shoulder pain and general weakness was related to the Crestor. He then started to work on his diet and his discontinue the medication. Unfortunately his body aches have not resolved. Hypertension  Compliant with medications. No adverse effects from medication. No lightheadedness, palpitations, or chest pain. He continues to have bilateral shoulder pain. The right is much worse than left. He has minimal range of motion. He cannot fully abduct beyond 45 degrees without difficulty. He has occasional numbness and tingling in the fifth and fourth digits of the right hand. He has had no swelling. No injury. No neck pain. Pain generally described as a dull ache but sharp and stabbing when he has to reach for something.         HPI    Past Medical History:   Diagnosis Date    Hypertension       Past Surgical History:   Procedure Laterality Date    COLONOSCOPY N/A 6/19/2019    Dr Keyshawn Estrada, 10 yr recall    FOOT NEUROMA SURGERY Left 2013       Family History   Problem Relation Age of Onset    No Known Problems Mother     Heart Disease Father     High Cholesterol Brother        Social History     Tobacco Use    Smoking status: Never Smoker    Smokeless tobacco: Never Used   Substance Use Topics    Alcohol use: Yes     Comment: social     Current Outpatient Medications   Medication Sig Dispense Refill    meloxicam (MOBIC) 15 MG tablet TAKE 1 TABLET BY MOUTH ONCE DAILY 30 tablet 5    lisinopril (PRINIVIL;ZESTRIL) 10 MG tablet TAKE (1) TABLET DAILY FOR HIGH and regular rhythm. Exam reveals no friction rub. Carotid arteries: no bruit observed. No murmur heard. Respiratory:  Effort normal and breath sounds normal. No respiratory distress. No wheezes. No rales. No use of accessory muscles or intercostal retractions. Abdominal: Soft. Bowel sounds are normal. exhibits no distension. There is no tenderness. There is no rebound and no guarding. Musculoskeletal: exhibits no edema. Normal gait. Unable to abduct right shoulder beyond 45 degrees. Pain with active abduction beyond this. Positive impingement sign  Neurological: alert. Psychiatric: normal mood and affect. His behavior is normal. Normal judgement and insight observed. Recent Results (from the past 672 hour(s))   Comprehensive Metabolic Panel    Collection Time: 09/29/20  7:22 AM   Result Value Ref Range    Sodium 142 136 - 145 mmol/L    Potassium 4.5 3.5 - 5.0 mmol/L    Chloride 104 98 - 111 mmol/L    CO2 24 22 - 29 mmol/L    Anion Gap 14 7 - 19 mmol/L    Glucose 123 (H) 74 - 109 mg/dL    BUN 10 6 - 20 mg/dL    CREATININE 0.8 0.5 - 1.2 mg/dL    GFR Non-African American >60 >60    GFR African American >59 >59    Calcium 9.4 8.6 - 10.0 mg/dL    Total Protein 7.7 6.6 - 8.7 g/dL    Alb 4.4 3.5 - 5.2 g/dL    Total Bilirubin 0.4 0.2 - 1.2 mg/dL    Alkaline Phosphatase 71 40 - 130 U/L    ALT 23 5 - 41 U/L    AST 26 5 - 40 U/L   Lipid Panel    Collection Time: 09/29/20  7:22 AM   Result Value Ref Range    Cholesterol, Total 193 160 - 199 mg/dL    Triglycerides 126 0 - 149 mg/dL    HDL 45 (L) 55 - 121 mg/dL    LDL Calculated 123 <100 mg/dL                 Assessment & Plan: The following diagnoses and conditions are stable with no further orders unless indicated:  1.  Familial hypercholesterolemia    Lab Results   Component Value Date    CHOL 193 09/29/2020    CHOL 143 (L) 07/06/2020    CHOL 167 02/08/2019     Lab Results   Component Value Date    TRIG 126 09/29/2020    TRIG 172 (H) 07/06/2020    TRIG 119 02/08/2019     Lab Results   Component Value Date    HDL 45 (L) 09/29/2020    HDL 41 (L) 07/06/2020    HDL 44 (L) 02/08/2019     Lab Results   Component Value Date    LDLCALC 123 09/29/2020 [address] 68 07/06/2020    [address] 99 02/08/2019   Continue to hold his Crestor until we can figure out his myalgias  No results found for: LABVLDL, VLDL  No results found for: CHOLHDLRATIO    2. Essential (primary) hypertension  BP Readings from Last 3 Encounters:   10/05/20 128/74   07/06/20 126/72   09/27/19 106/72     Blood pressure stable    3. Chronic right shoulder pain  Concern for his right shoulder pain. Will obtain a plain film of the right shoulder. Anticipate needing an MRI of his right shoulder. Symptoms are consistent with rotator cuff tear. He is tried over-the-counter anti-inflammatories such as Tylenol Aleve and ibuprofen with no resolution. He is tried home exercises without relief. He takes meloxicam on a daily basis as well. - XR SHOULDER RIGHT (MIN 2 VIEWS); Future      _______________________________________________________________    Addendum: October 6, 2020  X-ray of right shoulder does show mild AC joint degeneration. His exam is more consistent with a rotator cuff tear. He has failed conservative measures including anti-inflammatories and self-directed physical therapy. Suggest to get an MRI and anticipate referral to orthopedist      Return in about 6 months (around 4/5/2021) for Routine follow up - 15 minute visit. Discussed use, benefit, and side effects of prescribed medications. All patient questions answered. Pt voiced understanding. Reviewed health maintenance. Instructedto continue current medications, diet and exercise. Patient agreed with treatmentplan. Follow up as directed.        Note dictated using [id number] [email or web address]

## 2020-11-06 RX ORDER — LISINOPRIL 10 MG/1
TABLET ORAL
Qty: 90 TABLET | Refills: 3 | Status: SHIPPED | OUTPATIENT
Start: 2020-11-06 | End: 2022-03-23

## 2020-11-13 RX ORDER — ROSUVASTATIN CALCIUM 5 MG/1
TABLET, COATED ORAL
Qty: 30 TABLET | Refills: 0 | Status: SHIPPED | OUTPATIENT
Start: 2020-11-13 | End: 2021-01-27

## 2021-04-29 ENCOUNTER — OFFICE VISIT (OUTPATIENT)
Dept: FAMILY MEDICINE CLINIC | Age: 56
End: 2021-04-29
Payer: COMMERCIAL

## 2021-04-29 DIAGNOSIS — I10 ESSENTIAL (PRIMARY) HYPERTENSION: ICD-10-CM

## 2021-04-29 DIAGNOSIS — M48.02 CERVICAL SPINAL STENOSIS: Primary | ICD-10-CM

## 2021-04-29 DIAGNOSIS — E78.01 FAMILIAL HYPERCHOLESTEREMIA: ICD-10-CM

## 2021-04-29 PROCEDURE — G8427 DOCREV CUR MEDS BY ELIG CLIN: HCPCS | Performed by: FAMILY MEDICINE

## 2021-04-29 PROCEDURE — 1036F TOBACCO NON-USER: CPT | Performed by: FAMILY MEDICINE

## 2021-04-29 PROCEDURE — G8417 CALC BMI ABV UP PARAM F/U: HCPCS | Performed by: FAMILY MEDICINE

## 2021-04-29 PROCEDURE — 99214 OFFICE O/P EST MOD 30 MIN: CPT | Performed by: FAMILY MEDICINE

## 2021-04-29 PROCEDURE — 3017F COLORECTAL CA SCREEN DOC REV: CPT | Performed by: FAMILY MEDICINE

## 2021-04-29 RX ORDER — HYDROCODONE BITARTRATE AND ACETAMINOPHEN 7.5; 325 MG/1; MG/1
1 TABLET ORAL EVERY 6 HOURS PRN
Qty: 12 TABLET | Refills: 0 | Status: SHIPPED | OUTPATIENT
Start: 2021-04-29 | End: 2021-12-30 | Stop reason: SDUPTHER

## 2021-04-29 RX ORDER — TRAZODONE HYDROCHLORIDE 50 MG/1
TABLET ORAL
Qty: 30 TABLET | Refills: 5 | Status: SHIPPED | OUTPATIENT
Start: 2021-04-29 | End: 2021-06-16 | Stop reason: ALTCHOICE

## 2021-04-29 ASSESSMENT — PATIENT HEALTH QUESTIONNAIRE - PHQ9
2. FEELING DOWN, DEPRESSED OR HOPELESS: 0
SUM OF ALL RESPONSES TO PHQ QUESTIONS 1-9: 0
SUM OF ALL RESPONSES TO PHQ9 QUESTIONS 1 & 2: 0

## 2021-04-29 NOTE — PROGRESS NOTES
[facility] PHYSICIAN SERVICES  [facility] FAMILY MEDICINE  [id number] [place] [address]  [facility] [place] [address]  Dept: [phone]  Dept Fax: [phone]  Loc: [phone]    Blanca Gaitan is a 49 y.o. male who presents today for his medical conditions/complaints as noted below. Blanca Gaitan is here for 6 Month Follow-Up        HPI:   CC: Here today to discuss the following:    Hypertension  Compliant with medications. No adverse effects from medication. No lightheadedness, palpitations, or chest pain. Hyperlipidemia  Tolerating current cholesterol medication without side effects. No body aches. Attempting to reduce processed sugar and cholesterol from diet. HPI    Past Medical History:   Diagnosis Date    Hypertension       Past Surgical History:   Procedure Laterality Date    COLONOSCOPY N/A 6/19/2019    Dr Dawn Steel, 10 yr recall    FOOT NEUROMA SURGERY Left 2013       Family History   Problem Relation Age of Onset    No Known Problems Mother     Heart Disease Father     High Cholesterol Brother        Social History     Tobacco Use    Smoking status: Never Smoker    Smokeless tobacco: Never Used   Substance Use Topics    Alcohol use: Yes     Comment: social     Current Outpatient Medications   Medication Sig Dispense Refill    traZODone (DESYREL) 50 MG tablet 1-2 tablets po qhs prn insomnia. 30 tablet 5    rosuvastatin (CRESTOR) 5 MG tablet TAKE 1 TABLET BY MOUTH ONCE DAILY 30 tablet 2    lisinopril (PRINIVIL;ZESTRIL) 10 MG tablet TAKE 1 TABLET BY MOUTH ONCE DAILY FOR HIGH BLOOD PRESSURE 90 tablet 3    meloxicam (MOBIC) 15 MG tablet TAKE 1 TABLET BY MOUTH ONCE DAILY 30 tablet 5    Omega-3 Fatty Acids (FISH OIL) 1000 MG CAPS Take 1,000 mg by mouth daily       No current facility-administered medications for this visit.       No Known Allergies    Health Maintenance   Topic Date Due    Hepatitis C screen  Never done    HIV screen  Never done  Diabetes screen  Never done    Shingles Vaccine (1 of 2) Never done    Flu vaccine (Season Ended) 09/01/2021    Lipid screen  09/29/2021    Potassium monitoring  09/29/2021    Creatinine monitoring  09/29/2021    DTaP/Tdap/Td vaccine (2 - Td) 03/15/2023    Colon cancer screen colonoscopy  06/19/2029    COVID-19 Vaccine  Completed    Hepatitis A vaccine  Aged Out    Hepatitis B vaccine  Aged Out    Hib vaccine  Aged Out    Meningococcal (ACWY) vaccine  Aged Out    Pneumococcal 0-64 years Vaccine  Aged Out       Subjective:      Review of Systems  Review of Systems   Constitutional: Negative for chills and fever. HENT: Negative for congestion. Respiratory: Negative for cough, chest tightness and shortness of breath. Cardiovascular: Negative for chest pain, palpitations and leg swelling. Gastrointestinal: Negative for abdominal pain, anal bleeding, constipation, diarrhea and nausea. Genitourinary: Negative for difficulty urinating. Psychiatric/Behavioral: Negative. SeeHPI for visit specific review of symptoms. All others negative      Objective:   /82   Pulse 82   Temp 97.5 °F (36.4 °C)   Wt 211 lb (95.7 kg)   SpO2 98%   BMI 33.05 kg/m²   Physical Exam    Physical Exam   Constitutional: He appears well-developed. Does not appear ill. Neck: Normal range of motion. Neck supple. No masses. Neck Symmetric. Normal tracheal position. No thyroid enlargement  Cardiovascular: Normal rate and regular rhythm. Exam reveals no friction rub. Carotid arteries: no bruit observed. No murmur heard. Respiratory:  Effort normal and breath sounds normal. No respiratory distress. No wheezes. No rales. No use of accessory muscles or intercostal retractions. Neurological: alert. Psychiatric: normal mood and affect. His behavior is normal. Normal judgement and insight observed. No results found for this or any previous visit (from the past 672 hour(s)).             Assessment & Plan:        The following diagnoses and conditions are stable with no further orders unless indicated:  1. Essential (primary) hypertension  BP Readings from Last 3 Encounters:   04/29/21 132/82   10/05/20 128/74   07/06/20 126/72     Discussed lifestyle changes such as diet and exercise. Recommended eliminate processed food from diet such as sugar and fried foods. Recommended exercising at least 150 minutes/week. Try to do full body resistance training twice a week as well. Offered suggestions for calorie counting such as phone apps and online resources such as [employer] fitness pal and Lose it. Discussed healthy weight. Continue with current medication  - CBC Auto Differential; Future    2. Familial hypercholesteremia  Lab Results   Component Value Date    CHOL 193 09/29/2020    CHOL 143 (L) 07/06/2020    CHOL 167 02/08/2019     Lab Results   Component Value Date    TRIG 126 09/29/2020    TRIG 172 (H) 07/06/2020    TRIG 119 02/08/2019     Lab Results   Component Value Date    HDL 45 (L) 09/29/2020    HDL 41 (L) 07/06/2020    HDL 44 (L) 02/08/2019     Lab Results   Component Value Date    LDLCALC 123 09/29/2020    LDLCALC 68 07/06/2020    [address] 99 02/08/2019     No results found for: LABVLDL, VLDL  No results found for: CHOLHDLRATIO  Discussed low-fat diet  - Lipid Panel; Future  - Comprehensive Metabolic Panel; Future    3. Cervical spinal stenosis  He has been to the orthopedic [place] to have his right shoulder evaluated. He also has chronic neck issues. He was found to have a partial tear of his right rotator cuff. He is considering having surgical intervention but wants to wait until after summer. He is taking anti-inflammatories. He states he would like to have some hydrocodone on hand as sometimes the pain makes it difficult for him to sleep at night. Discussed risk and benefits of taking opioids. Risks include addiction and tolerance.   If develops impairment or over sedation with medication, discontinue and contact me. Do not take medication with alcohol. Advised to take sparingly. Advised to keep medication hidden and locked up as risk of theft is high with these medications as well.     - HYDROcodone-acetaminophen (NORCO) 7.5-325 MG per tablet; Take 1 tablet by mouth every 6 hours as needed for Pain for up to 3 days. Dispense: 12 tablet; Refill: 0            Return in about 6 months (around 10/29/2021) for Yearly Physical - 30 minute visit. Discussed use, benefit, and side effects of prescribed medications. All patient questions answered. Pt voiced understanding. Reviewed health maintenance. Instructedto continue current medications, diet and exercise. Patient agreed with treatmentplan. Follow up as directed. Note dictated using Dragon Dictation software  Sometimes this dictation software makes erroneous transcriptions.

## 2021-05-03 VITALS
BODY MASS INDEX: 33.05 KG/M2 | TEMPERATURE: 97.5 F | DIASTOLIC BLOOD PRESSURE: 82 MMHG | WEIGHT: 211 LBS | OXYGEN SATURATION: 98 % | SYSTOLIC BLOOD PRESSURE: 132 MMHG | HEART RATE: 82 BPM

## 2021-05-24 ENCOUNTER — TELEPHONE (OUTPATIENT)
Dept: FAMILY MEDICINE CLINIC | Age: 56
End: 2021-05-24

## 2021-05-24 NOTE — TELEPHONE ENCOUNTER
Last visit he states yall discussed his trazodone and he has given it some time to get in his system  but  he states its still not working so he is requesting something different.

## 2021-06-07 NOTE — TELEPHONE ENCOUNTER
Called left a  for patient to call me back and let me know if he is still having trouble and if so we can do a VV Friday with him.

## 2021-06-11 ENCOUNTER — VIRTUAL VISIT (OUTPATIENT)
Dept: FAMILY MEDICINE CLINIC | Age: 56
End: 2021-06-11
Payer: COMMERCIAL

## 2021-06-11 DIAGNOSIS — F51.04 CHRONIC INSOMNIA: Primary | ICD-10-CM

## 2021-06-11 PROCEDURE — 3017F COLORECTAL CA SCREEN DOC REV: CPT | Performed by: FAMILY MEDICINE

## 2021-06-11 PROCEDURE — G8428 CUR MEDS NOT DOCUMENT: HCPCS | Performed by: FAMILY MEDICINE

## 2021-06-11 PROCEDURE — 99213 OFFICE O/P EST LOW 20 MIN: CPT | Performed by: FAMILY MEDICINE

## 2021-06-11 RX ORDER — ESZOPICLONE 2 MG/1
2 TABLET, FILM COATED ORAL NIGHTLY
Qty: 30 TABLET | Refills: 0 | Status: SHIPPED | OUTPATIENT
Start: 2021-06-11 | End: 2021-11-02 | Stop reason: ALTCHOICE

## 2021-06-11 NOTE — PROGRESS NOTES
2021    TELEHEALTH EVALUATION -- Audio/Visual (During NFYJQ-52 public health emergency)    HPI:    Brandon Cristina (:  1965) has requested an audio/video evaluation for the following concern(s):    Continues to have issues with insomnia. Trazodone did not appear to be helping. Review of Systems    Prior to Visit Medications    Medication Sig Taking? Authorizing Provider   eszopiclone (LUNESTA) 2 MG TABS Take 1 tablet by mouth nightly. Yes Gonzales Salgado MD   traZODone (DESYREL) 50 MG tablet 1-2 tablets po qhs prn insomnia. Gonzales Salgado MD   rosuvastatin (CRESTOR) 5 MG tablet TAKE 1 TABLET BY MOUTH ONCE DAILY  Gonzales Salgado MD   lisinopril (PRINIVIL;ZESTRIL) 10 MG tablet TAKE 1 TABLET BY MOUTH ONCE DAILY FOR HIGH BLOOD PRESSURE  Gonzales Salgado MD   meloxicam (MOBIC) 15 MG tablet TAKE 1 TABLET BY MOUTH ONCE DAILY  Gonzales Salgado MD   Omega-3 Fatty Acids (FISH OIL) 1000 MG CAPS Take 1,000 mg by mouth daily  Historical Provider, MD       Social History     Tobacco Use    Smoking status: Never Smoker    Smokeless tobacco: Never Used   Vaping Use    Vaping Use: Never used   Substance Use Topics    Alcohol use: Yes     Comment: social    Drug use: No            PHYSICAL EXAMINATION:  [ INSTRUCTIONS:  \"[x]\" Indicates a positive item  \"[]\" Indicates a negative item  -- DELETE ALL ITEMS NOT EXAMINED]  Vital Signs: (As obtained by patient/caregiver or practitioner observation)    Blood pressure-  Heart rate-    Respiratory rate-    Temperature-  Pulse oximetry-     Constitutional: [x] Appears well-developed and well-nourished [x] No apparent distress      [] Abnormal-   Mental status  [x] Alert and awake  [] Oriented to person/place/time [x]Able to follow commands      Eyes:  EOM    [x]  Normal  [] Abnormal-  Sclera  [x]  Normal  [] Abnormal -         Discharge []  None visible  [] Abnormal -    HENT:   [x] Normocephalic, atraumatic.   [] Abnormal   [] Mouth/Throat: Mucous membranes are moist.     External Ears [x] Normal  [] Abnormal-     Neck: [x] No visualized mass     Pulmonary/Chest: [x] Respiratory effort normal.  [x] No visualized signs of difficulty breathing or respiratory distress        [] Abnormal-      Musculoskeletal:   [] Normal gait with no signs of ataxia         [] Normal range of motion of neck        [] Abnormal-       Neurological:        [] No Facial Asymmetry (Cranial nerve 7 motor function) (limited exam to video visit)          [] No gaze palsy        [] Abnormal-         Skin:        [x] No significant exanthematous lesions or discoloration noted on facial skin         [] Abnormal-            Psychiatric:       [x] Normal Affect [x] No Hallucinations        [] Abnormal-     Other pertinent observable physical exam findings-     No results found for this or any previous visit (from the past 672 hour(s)). ASSESSMENT/PLAN:  1. Chronic insomnia  Discontinue trazodone and switch to Lunesta  Discussed risks and benefits of this new medication. Discussed potential side effects. He voiced understanding.     - eszopiclone (LUNESTA) 2 MG TABS; Take 1 tablet by mouth nightly. Dispense: 30 tablet; Refill: 0      No follow-ups on file. Cyndi Cohen is a 49 y.o. male being evaluated by a Virtual Visit (video visit) encounter to address concerns as mentioned above. A caregiver was present when appropriate. Due to this being a TeleHealth encounter (During BZLXS-37 public health emergency), evaluation of the following organ systems was limited: Vitals/Constitutional/EENT/Resp/CV/GI//MS/Neuro/Skin/Heme-Lymph-Imm.   Pursuant to the emergency declaration under the [address] [place] [address], [address] [place] [address] authority and the [employer] Act, this Virtual Visit was conducted with patient's (and/or legal guardian's) consent, to reduce the patient's risk of exposure to COVID-19 and provide necessary medical care. The patient (and/or legal guardian) has also been advised to contact this office for worsening conditions or problems, and seek emergency medical treatment and/or call 911 if deemed necessary. Patient identification was verified at the start of the visit: Yes    Total time spent on this encounter: Not billed by time    Services were provided through a video synchronous discussion virtually to substitute for in-person clinic visit. Patient and provider were located at their individual homes. --Marie Vu MD on 6/16/2021 at 3:14 PM    An electronic signature was used to authenticate this note.

## 2021-10-11 DIAGNOSIS — E78.01 FAMILIAL HYPERCHOLESTEROLEMIA: ICD-10-CM

## 2021-10-11 RX ORDER — MELOXICAM 15 MG/1
TABLET ORAL
Qty: 90 TABLET | Refills: 0 | Status: SHIPPED | OUTPATIENT
Start: 2021-10-11 | End: 2022-08-09 | Stop reason: SDUPTHER

## 2021-10-11 RX ORDER — ROSUVASTATIN CALCIUM 5 MG/1
TABLET, COATED ORAL
Qty: 90 TABLET | Refills: 0 | Status: SHIPPED | OUTPATIENT
Start: 2021-10-11 | End: 2022-03-23

## 2021-10-11 NOTE — TELEPHONE ENCOUNTER
Litzy Shabbir called to request a refill on his medication.       Last office visit : 6/11/2021   Next office visit : 11/2/2021     Requested Prescriptions     Signed Prescriptions Disp Refills    meloxicam (MOBIC) 15 MG tablet 90 tablet 0     Sig: TAKE 1 TABLET BY MOUTH ONCE DAILY     Authorizing Provider: Shannan Gracia User: Aruna Reyes    rosuvastatin (CRESTOR) 5 MG tablet 90 tablet 0     Sig: TAKE 1 TABLET BY MOUTH ONCE DAILY     Authorizing Provider: Shannan Gracia User: Divya Mckenna

## 2021-10-28 DIAGNOSIS — E78.01 FAMILIAL HYPERCHOLESTEREMIA: ICD-10-CM

## 2021-10-28 DIAGNOSIS — I10 ESSENTIAL (PRIMARY) HYPERTENSION: ICD-10-CM

## 2021-10-28 LAB
ALBUMIN SERPL-MCNC: 4.5 G/DL (ref 3.5–5.2)
ALP BLD-CCNC: 58 U/L (ref 40–130)
ALT SERPL-CCNC: 23 U/L (ref 5–41)
ANION GAP SERPL CALCULATED.3IONS-SCNC: 10 MMOL/L (ref 7–19)
AST SERPL-CCNC: 23 U/L (ref 5–40)
BASOPHILS ABSOLUTE: 0.1 K/UL (ref 0–0.2)
BASOPHILS RELATIVE PERCENT: 0.7 % (ref 0–1)
BILIRUB SERPL-MCNC: 0.3 MG/DL (ref 0.2–1.2)
BUN BLDV-MCNC: 8 MG/DL (ref 6–20)
CALCIUM SERPL-MCNC: 9.3 MG/DL (ref 8.6–10)
CHLORIDE BLD-SCNC: 105 MMOL/L (ref 98–111)
CHOLESTEROL, TOTAL: 140 MG/DL (ref 160–199)
CO2: 26 MMOL/L (ref 22–29)
CREAT SERPL-MCNC: 0.8 MG/DL (ref 0.5–1.2)
EOSINOPHILS ABSOLUTE: 0.2 K/UL (ref 0–0.6)
EOSINOPHILS RELATIVE PERCENT: 2.1 % (ref 0–5)
GFR AFRICAN AMERICAN: >59
GFR NON-AFRICAN AMERICAN: >60
GLUCOSE BLD-MCNC: 110 MG/DL (ref 74–109)
HCT VFR BLD CALC: 47.2 % (ref 42–52)
HDLC SERPL-MCNC: 43 MG/DL (ref 55–121)
HEMOGLOBIN: 15.5 G/DL (ref 14–18)
IMMATURE GRANULOCYTES #: 0 K/UL
LDL CHOLESTEROL CALCULATED: 81 MG/DL
LYMPHOCYTES ABSOLUTE: 2.8 K/UL (ref 1.1–4.5)
LYMPHOCYTES RELATIVE PERCENT: 38.8 % (ref 20–40)
MCH RBC QN AUTO: 30.8 PG (ref 27–31)
MCHC RBC AUTO-ENTMCNC: 32.8 G/DL (ref 33–37)
MCV RBC AUTO: 93.8 FL (ref 80–94)
MONOCYTES ABSOLUTE: 0.6 K/UL (ref 0–0.9)
MONOCYTES RELATIVE PERCENT: 7.6 % (ref 0–10)
NEUTROPHILS ABSOLUTE: 3.7 K/UL (ref 1.5–7.5)
NEUTROPHILS RELATIVE PERCENT: 50.7 % (ref 50–65)
PDW BLD-RTO: 12.1 % (ref 11.5–14.5)
PLATELET # BLD: 248 K/UL (ref 130–400)
PMV BLD AUTO: 9.9 FL (ref 9.4–12.4)
POTASSIUM SERPL-SCNC: 4.2 MMOL/L (ref 3.5–5)
RBC # BLD: 5.03 M/UL (ref 4.7–6.1)
SODIUM BLD-SCNC: 141 MMOL/L (ref 136–145)
TOTAL PROTEIN: 7.5 G/DL (ref 6.6–8.7)
TRIGL SERPL-MCNC: 79 MG/DL (ref 0–149)
WBC # BLD: 7.3 K/UL (ref 4.8–10.8)

## 2021-11-02 ENCOUNTER — OFFICE VISIT (OUTPATIENT)
Dept: FAMILY MEDICINE CLINIC | Age: 56
End: 2021-11-02
Payer: COMMERCIAL

## 2021-11-02 VITALS
DIASTOLIC BLOOD PRESSURE: 82 MMHG | HEART RATE: 85 BPM | BODY MASS INDEX: 33.43 KG/M2 | SYSTOLIC BLOOD PRESSURE: 130 MMHG | WEIGHT: 213 LBS | HEIGHT: 67 IN | TEMPERATURE: 97 F | OXYGEN SATURATION: 98 %

## 2021-11-02 DIAGNOSIS — Z12.5 ENCOUNTER FOR PROSTATE CANCER SCREENING: ICD-10-CM

## 2021-11-02 DIAGNOSIS — E78.00 HYPERCHOLESTEROLEMIA: ICD-10-CM

## 2021-11-02 DIAGNOSIS — R73.9 HYPERGLYCEMIA: ICD-10-CM

## 2021-11-02 DIAGNOSIS — I10 ESSENTIAL (PRIMARY) HYPERTENSION: ICD-10-CM

## 2021-11-02 DIAGNOSIS — Z00.00 ANNUAL PHYSICAL EXAM: Primary | ICD-10-CM

## 2021-11-02 DIAGNOSIS — F51.04 CHRONIC INSOMNIA: ICD-10-CM

## 2021-11-02 PROCEDURE — 99396 PREV VISIT EST AGE 40-64: CPT | Performed by: FAMILY MEDICINE

## 2021-11-02 PROCEDURE — G8484 FLU IMMUNIZE NO ADMIN: HCPCS | Performed by: FAMILY MEDICINE

## 2021-11-02 RX ORDER — TEMAZEPAM 15 MG/1
15 CAPSULE ORAL NIGHTLY PRN
Qty: 30 CAPSULE | Refills: 1 | Status: SHIPPED | OUTPATIENT
Start: 2021-11-02 | End: 2022-05-09 | Stop reason: SDUPTHER

## 2021-11-02 NOTE — PROGRESS NOTES
[facility] PHYSICIAN SERVICES  [facility] FAMILY MEDICINE  [address] [address] [place] [address]  Dept: [phone]  Dept Fax: [phone]  Loc: [phone]    Eliazar Walker is a 49 y.o. male who presents today for his medical conditions/complaints as noted below. Eliazar Walker is here for Annual Exam and Insomnia        HPI:   CC: Here today to discuss the following:      Hypertension  Compliant with medications. No adverse effects from medication. No lightheadedness, palpitations, or chest pain. Hyperlipidemia  Tolerating current cholesterol medication without side effects. No body aches. Attempting to reduce processed sugar and cholesterol from diet. Insomnia  Continues to have issues with insomnia. No relief with trazodone or Lunesta          HPI    Past Medical History:   Diagnosis Date    Hypertension       Past Surgical History:   Procedure Laterality Date    COLONOSCOPY N/A 6/19/2019    Dr Nita Malagon, 10 yr recall    FOOT NEUROMA SURGERY Left 2013       Family History   Problem Relation Age of Onset    No Known Problems Mother     Heart Disease Father     High Cholesterol Brother        Social History     Tobacco Use    Smoking status: Never Smoker    Smokeless tobacco: Never Used   Substance Use Topics    Alcohol use: Yes     Comment: social     Current Outpatient Medications   Medication Sig Dispense Refill    meloxicam (MOBIC) 15 MG tablet TAKE 1 TABLET BY MOUTH ONCE DAILY 90 tablet 0    rosuvastatin (CRESTOR) 5 MG tablet TAKE 1 TABLET BY MOUTH ONCE DAILY 90 tablet 0    lisinopril (PRINIVIL;ZESTRIL) 10 MG tablet TAKE 1 TABLET BY MOUTH ONCE DAILY FOR HIGH BLOOD PRESSURE 90 tablet 3    eszopiclone (LUNESTA) 2 MG TABS Take 1 tablet by mouth nightly.  (Patient not taking: Reported on 11/2/2021) 30 tablet 0    Omega-3 Fatty Acids (FISH OIL) 1000 MG CAPS Take 1,000 mg by mouth daily (Patient not taking: Reported on 11/2/2021)       No current facility-administered medications for this visit. No Known Allergies    Health Maintenance   Topic Date Due    Hepatitis C screen  Never done    HIV screen  Never done    Diabetes screen  Never done    Shingles Vaccine (1 of 2) Never done    Flu vaccine (1) 09/01/2021    Lipid screen  10/28/2022    Potassium monitoring  10/28/2022    Creatinine monitoring  10/28/2022    DTaP/Tdap/Td vaccine (2 - Td or Tdap) 03/15/2023    Colon cancer screen colonoscopy  06/19/2029    COVID-19 Vaccine  Completed    Hepatitis A vaccine  Aged Out    Hepatitis B vaccine  Aged Out    Hib vaccine  Aged Out    Meningococcal (ACWY) vaccine  Aged Out    Pneumococcal 0-64 years Vaccine  Aged Out       Subjective:      Review of Systems  Review of Systems   Constitutional: Negative for chills and fever. HENT: Negative for congestion. Respiratory: Negative for cough, chest tightness and shortness of breath. Cardiovascular: Negative for chest pain, palpitations and leg swelling. Gastrointestinal: Negative for abdominal pain, anal bleeding, constipation, diarrhea and nausea. Genitourinary: Negative for difficulty urinating. Psychiatric/Behavioral: Negative. SeeHPI for visit specific review of symptoms. All others negative      Objective:   /82   Pulse 85   Temp 97 °F (36.1 °C)   Ht 5' 7\" (1.702 m)   Wt 213 lb (96.6 kg)   SpO2 98%   BMI 33.36 kg/m²   Physical Exam  Physical Exam   Constitutional: He appears well-developed. Does not appear ill. Neck: Normal range of motion. Neck supple. No masses. Neck Symmetric. Normal tracheal position. No thyroid enlargement  Cardiovascular: Normal rate and regular rhythm. Exam reveals no friction rub. Carotid arteries: no bruit observed. No murmur heard. Respiratory:  Effort normal and breath sounds normal. No respiratory distress. No wheezes. No rales. No use of accessory muscles or intercostal retractions. Neurological: alert. exam  Discussed lifestyle changes such as diet and exercise. Recommended eliminate processed food from diet such as sugar and fried foods. Recommended exercising at least 150 minutes/week. Try to do full body resistance training twice a week as well. Offered suggestions for calorie counting such as phone apps and online resources such as My fitness pal and Lose it. Discussed healthy weight. 2. Essential (primary) hypertension  BP Readings from Last 3 Encounters:   11/02/21 130/82   04/29/21 132/82   10/05/20 128/74     Blood pressure stable  - CBC Auto Differential; Future    3. Hypercholesterolemia  Lab Results   Component Value Date    CHOL 140 (L) 10/28/2021    CHOL 193 09/29/2020    CHOL 143 (L) 07/06/2020     Lab Results   Component Value Date    TRIG 79 10/28/2021    TRIG 126 09/29/2020    TRIG 172 (H) 07/06/2020     Lab Results   Component Value Date    HDL 43 (L) 10/28/2021    HDL 45 (L) 09/29/2020    HDL 41 (L) 07/06/2020     Lab Results   Component Value Date    LDLCALC 81 10/28/2021    [address] 123 09/29/2020    [address] 68 07/06/2020     No results found for: LABVLDL, VLDL  No results found for: CHOLHDLRATIO  Cholesterol stable continue on Crestor  - Comprehensive Metabolic Panel; Future    4. Chronic insomnia  Switch to temazepam 15 mg. If no relief he may increase to 30 mg  Discussed risks and benefits of this new medication. Discussed potential side effects. He voiced understanding. 5. Hyperglycemia  No results found for: LABA1C  Lab Results   Component Value Date    LDLCALC 81 10/28/2021    CREATININE 0.8 10/28/2021     Glucose slightly elevated  Reviewed dietary recommendations  Check A1c next visit  - Hemoglobin A1C; Future    6. Encounter for prostate cancer screening  Lab Results   Component Value Date    PSA 0.69 07/06/2020    PSA 1.58 02/08/2019    PSA 0.60 02/01/2018     Recheck next visit  - PSA screening;  Future            Return in about 6 months (around 5/2/2022) for Routine follow up - 15 minute visit. Discussed use, benefit, and side effects of prescribed medications. All patient questions answered. Pt voiced understanding. Reviewed health maintenance. Instructedto continue current medications, diet and exercise. Patient agreed with treatmentplan. Follow up as directed. Note dictated using Dragon Dictation software  Sometimes this dictation software makes erroneous transcriptions.

## 2021-12-30 DIAGNOSIS — M48.02 CERVICAL SPINAL STENOSIS: ICD-10-CM

## 2021-12-30 RX ORDER — HYDROCODONE BITARTRATE AND ACETAMINOPHEN 7.5; 325 MG/1; MG/1
1 TABLET ORAL EVERY 6 HOURS PRN
Qty: 12 TABLET | Refills: 0 | Status: SHIPPED | OUTPATIENT
Start: 2021-12-30 | End: 2022-04-22 | Stop reason: SDUPTHER

## 2021-12-30 NOTE — TELEPHONE ENCOUNTER
Refilled medication and e-scribed to pharmacy. Confirm prescription was due. Make sure BHARTI and urine drug screen is up to date.

## 2021-12-30 NOTE — TELEPHONE ENCOUNTER
Patient called & is requesting a short term supply of hydrocodone to have on hand for his neck & shoulder pain. Last prescribed 4/19/21. Vickie Gamez called to request a refill on his medication. Last office visit : 11/2/2021   Next office visit : 5/2/2022     Last UDS: No results found for: Pennelope Houston, LABBENZ, BUPRENUR, COCAIMETSCRU, GABAPENTIN, MDMA, METAMPU, OPIATESCREENURINE, OXTCOSU, PHENCYCLIDINESCREENURINE, PROPOXYPHENE, THCSCREENUR, TRICYUR    Last Josh: 12/30/21  Medication Contract: needs   Last Fill: 4/29/21    Requested Prescriptions     Pending Prescriptions Disp Refills    HYDROcodone-acetaminophen (NORCO) 7.5-325 MG per tablet 12 tablet 0     Sig: Take 1 tablet by mouth every 6 hours as needed for Pain for up to 3 days. Please approve or refuse this medication.    Lam Andrew MA

## 2022-03-23 DIAGNOSIS — E78.01 FAMILIAL HYPERCHOLESTEROLEMIA: ICD-10-CM

## 2022-03-23 DIAGNOSIS — I10 ESSENTIAL (PRIMARY) HYPERTENSION: ICD-10-CM

## 2022-03-23 RX ORDER — LISINOPRIL 10 MG/1
TABLET ORAL
Qty: 90 TABLET | Refills: 0 | Status: SHIPPED | OUTPATIENT
Start: 2022-03-23 | End: 2022-05-09 | Stop reason: SDUPTHER

## 2022-03-23 RX ORDER — ROSUVASTATIN CALCIUM 5 MG/1
TABLET, COATED ORAL
Qty: 90 TABLET | Refills: 0 | Status: SHIPPED | OUTPATIENT
Start: 2022-03-23 | End: 2022-05-09 | Stop reason: SDUPTHER

## 2022-04-21 DIAGNOSIS — M48.02 CERVICAL SPINAL STENOSIS: ICD-10-CM

## 2022-04-21 NOTE — TELEPHONE ENCOUNTER
Jame Oakes called to request a refill on his medication. Last office visit : 11/2/2021   Next office visit : 5/2/2022     Last UDS: No results found for: Valeriy Prakash, LABBENZ, BUPRENUR, COCAIMETSCRU, GABAPENTIN, MDMA, METAMPU, OPIATESCREENURINE, OXTCOSU, PHENCYCLIDINESCREENURINE, PROPOXYPHENE, THCSCREENUR, TRICYUR    Last Josh: 4/21/22  Medication Contract: Needs   Last Fill: 12/30/21    Requested Prescriptions     Pending Prescriptions Disp Refills    HYDROcodone-acetaminophen (NORCO) 7.5-325 MG per tablet 12 tablet 0     Sig: Take 1 tablet by mouth every 6 hours as needed for Pain for up to 3 days. Please approve or refuse this medication.    Sergio Comp

## 2022-04-22 RX ORDER — HYDROCODONE BITARTRATE AND ACETAMINOPHEN 7.5; 325 MG/1; MG/1
1 TABLET ORAL EVERY 6 HOURS PRN
Qty: 12 TABLET | Refills: 0 | Status: SHIPPED | OUTPATIENT
Start: 2022-04-22 | End: 2022-05-24 | Stop reason: SDUPTHER

## 2022-04-28 DIAGNOSIS — I10 ESSENTIAL (PRIMARY) HYPERTENSION: ICD-10-CM

## 2022-04-28 DIAGNOSIS — Z12.5 ENCOUNTER FOR PROSTATE CANCER SCREENING: ICD-10-CM

## 2022-04-28 DIAGNOSIS — E78.00 HYPERCHOLESTEROLEMIA: ICD-10-CM

## 2022-04-28 DIAGNOSIS — R73.9 HYPERGLYCEMIA: ICD-10-CM

## 2022-04-28 LAB
ALBUMIN SERPL-MCNC: 4.6 G/DL (ref 3.5–5.2)
ALP BLD-CCNC: 72 U/L (ref 40–130)
ALT SERPL-CCNC: 21 U/L (ref 5–41)
ANION GAP SERPL CALCULATED.3IONS-SCNC: 14 MMOL/L (ref 7–19)
AST SERPL-CCNC: 21 U/L (ref 5–40)
BASOPHILS ABSOLUTE: 0.1 K/UL (ref 0–0.2)
BASOPHILS RELATIVE PERCENT: 0.7 % (ref 0–1)
BILIRUB SERPL-MCNC: 0.5 MG/DL (ref 0.2–1.2)
BUN BLDV-MCNC: 12 MG/DL (ref 6–20)
CALCIUM SERPL-MCNC: 9.6 MG/DL (ref 8.6–10)
CHLORIDE BLD-SCNC: 103 MMOL/L (ref 98–111)
CO2: 24 MMOL/L (ref 22–29)
CREAT SERPL-MCNC: 0.9 MG/DL (ref 0.5–1.2)
EOSINOPHILS ABSOLUTE: 0.2 K/UL (ref 0–0.6)
EOSINOPHILS RELATIVE PERCENT: 2.1 % (ref 0–5)
GFR AFRICAN AMERICAN: >59
GFR NON-AFRICAN AMERICAN: >60
GLUCOSE BLD-MCNC: 105 MG/DL (ref 74–109)
HBA1C MFR BLD: 5.4 % (ref 4–6)
HCT VFR BLD CALC: 47.3 % (ref 42–52)
HEMOGLOBIN: 15.7 G/DL (ref 14–18)
IMMATURE GRANULOCYTES #: 0 K/UL
LYMPHOCYTES ABSOLUTE: 2.2 K/UL (ref 1.1–4.5)
LYMPHOCYTES RELATIVE PERCENT: 30 % (ref 20–40)
MCH RBC QN AUTO: 31.9 PG (ref 27–31)
MCHC RBC AUTO-ENTMCNC: 33.2 G/DL (ref 33–37)
MCV RBC AUTO: 96.1 FL (ref 80–94)
MONOCYTES ABSOLUTE: 0.5 K/UL (ref 0–0.9)
MONOCYTES RELATIVE PERCENT: 6.6 % (ref 0–10)
NEUTROPHILS ABSOLUTE: 4.4 K/UL (ref 1.5–7.5)
NEUTROPHILS RELATIVE PERCENT: 60.3 % (ref 50–65)
PDW BLD-RTO: 12.3 % (ref 11.5–14.5)
PLATELET # BLD: 276 K/UL (ref 130–400)
PMV BLD AUTO: 10.2 FL (ref 9.4–12.4)
POTASSIUM SERPL-SCNC: 4.3 MMOL/L (ref 3.5–5)
PROSTATE SPECIFIC ANTIGEN: 0.77 NG/ML (ref 0–4)
RBC # BLD: 4.92 M/UL (ref 4.7–6.1)
SODIUM BLD-SCNC: 141 MMOL/L (ref 136–145)
TOTAL PROTEIN: 7.1 G/DL (ref 6.6–8.7)
WBC # BLD: 7.3 K/UL (ref 4.8–10.8)

## 2022-05-09 ENCOUNTER — OFFICE VISIT (OUTPATIENT)
Dept: FAMILY MEDICINE CLINIC | Age: 57
End: 2022-05-09
Payer: COMMERCIAL

## 2022-05-09 VITALS
WEIGHT: 196 LBS | DIASTOLIC BLOOD PRESSURE: 78 MMHG | TEMPERATURE: 98.3 F | BODY MASS INDEX: 30.7 KG/M2 | OXYGEN SATURATION: 97 % | SYSTOLIC BLOOD PRESSURE: 126 MMHG | HEART RATE: 80 BPM

## 2022-05-09 DIAGNOSIS — I10 ESSENTIAL (PRIMARY) HYPERTENSION: Primary | ICD-10-CM

## 2022-05-09 DIAGNOSIS — E78.01 FAMILIAL HYPERCHOLESTEREMIA: ICD-10-CM

## 2022-05-09 DIAGNOSIS — F51.04 CHRONIC INSOMNIA: ICD-10-CM

## 2022-05-09 PROCEDURE — G8427 DOCREV CUR MEDS BY ELIG CLIN: HCPCS | Performed by: FAMILY MEDICINE

## 2022-05-09 PROCEDURE — 99213 OFFICE O/P EST LOW 20 MIN: CPT | Performed by: FAMILY MEDICINE

## 2022-05-09 PROCEDURE — 1036F TOBACCO NON-USER: CPT | Performed by: FAMILY MEDICINE

## 2022-05-09 PROCEDURE — 3017F COLORECTAL CA SCREEN DOC REV: CPT | Performed by: FAMILY MEDICINE

## 2022-05-09 PROCEDURE — G8417 CALC BMI ABV UP PARAM F/U: HCPCS | Performed by: FAMILY MEDICINE

## 2022-05-09 RX ORDER — LISINOPRIL 10 MG/1
10 TABLET ORAL DAILY
Qty: 90 TABLET | Refills: 3 | Status: SHIPPED | OUTPATIENT
Start: 2022-05-09

## 2022-05-09 RX ORDER — ROSUVASTATIN CALCIUM 5 MG/1
TABLET, COATED ORAL
Qty: 90 TABLET | Refills: 3 | Status: SHIPPED | OUTPATIENT
Start: 2022-05-09

## 2022-05-09 RX ORDER — TEMAZEPAM 30 MG/1
30 CAPSULE ORAL NIGHTLY PRN
Qty: 30 CAPSULE | Refills: 5 | Status: SHIPPED | OUTPATIENT
Start: 2022-05-09 | End: 2022-06-08

## 2022-05-09 ASSESSMENT — PATIENT HEALTH QUESTIONNAIRE - PHQ9
SUM OF ALL RESPONSES TO PHQ QUESTIONS 1-9: 0
SUM OF ALL RESPONSES TO PHQ QUESTIONS 1-9: 0
SUM OF ALL RESPONSES TO PHQ9 QUESTIONS 1 & 2: 0
SUM OF ALL RESPONSES TO PHQ QUESTIONS 1-9: 0
1. LITTLE INTEREST OR PLEASURE IN DOING THINGS: 0
2. FEELING DOWN, DEPRESSED OR HOPELESS: 0
SUM OF ALL RESPONSES TO PHQ QUESTIONS 1-9: 0

## 2022-05-09 NOTE — PROGRESS NOTES
[facility], located within [facility] PHYSICIAN SERVICES  [facility] FAMILY MEDICINE  [address] [address]  [facility] [place] [address]  Dept: [phone]  Dept Fax: [phone]  Loc: [phone]    Colin Toro is a 56 y.o. male who presents today for his medical conditions/complaints as noted below. Colin Toro is here for 6 Month Follow-Up        HPI:   CC: Here today to discuss the following:    Hypertension  Compliant with medications. No adverse effects from medication. No lightheadedness, palpitations, or chest pain. Hyperlipidemia  Tolerating current cholesterol medication without side effects. . Attempting to reduce processed sugar and cholesterol from diet. Is going through a divorce and having increase stress. Also associated with insomnia        HPI    Subjective:      Review of Systems    Review of Systems   Constitutional: Negative for chills and fever. HENT: Negative for congestion. Respiratory: Negative for cough, chest tightness and shortness of breath. Cardiovascular: Negative for chest pain, palpitations and leg swelling. Gastrointestinal: Negative for abdominal pain, anal bleeding, constipation, diarrhea and nausea. Genitourinary: Negative for difficulty urinating. Psychiatric/Behavioral: Negative. SeeHPI for visit specific review of symptoms. All others negative      Objective:   /78   Pulse 80   Temp 98.3 °F (36.8 °C)   Wt 196 lb (88.9 kg)   SpO2 97%   BMI 30.70 kg/m²   Physical Exam  Physical Exam   Constitutional: He appears well-developed. Does not appear ill. Neck: Normal range of motion. Neck supple. No masses. Neck Symmetric. Normal tracheal position. No thyroid enlargement  Cardiovascular: Normal rate and regular rhythm. Exam reveals no friction rub. Carotid arteries: no bruit observed. No murmur heard. Respiratory:  Effort normal and breath sounds normal. No respiratory distress. No wheezes. No rales.  No use of accessory muscles or intercostal retractions. Neurological: alert. Psychiatric: normal mood and affect. His behavior is normal. Normal judgement and insight observed.       Recent Results (from the past 672 hour(s))   PSA screening    Collection Time: 04/28/22 11:24 AM   Result Value Ref Range    PSA 0.77 0.00 - 4.00 ng/mL   CBC Auto Differential    Collection Time: 04/28/22 11:24 AM   Result Value Ref Range    WBC 7.3 4.8 - 10.8 K/uL    RBC 4.92 4.70 - 6.10 M/uL    Hemoglobin 15.7 14.0 - 18.0 g/dL    Hematocrit 47.3 42.0 - 52.0 %    MCV 96.1 (H) 80.0 - 94.0 fL    MCH 31.9 (H) 27.0 - 31.0 pg    MCHC 33.2 33.0 - 37.0 g/dL    RDW 12.3 11.5 - 14.5 %    Platelets 679 963 - 336 K/uL    MPV 10.2 9.4 - 12.4 fL    Neutrophils % 60.3 50.0 - 65.0 %    Lymphocytes % 30.0 20.0 - 40.0 %    Monocytes % 6.6 0.0 - 10.0 %    Eosinophils % 2.1 0.0 - 5.0 %    Basophils % 0.7 0.0 - 1.0 %    Neutrophils Absolute 4.4 1.5 - 7.5 K/uL    Immature Granulocytes # 0.0 K/uL    Lymphocytes Absolute 2.2 1.1 - 4.5 K/uL    Monocytes Absolute 0.50 0.00 - 0.90 K/uL    Eosinophils Absolute 0.20 0.00 - 0.60 K/uL    Basophils Absolute 0.10 0.00 - 0.20 K/uL   Hemoglobin A1C    Collection Time: 04/28/22 11:24 AM   Result Value Ref Range    Hemoglobin A1C 5.4 4.0 - 6.0 %   Comprehensive Metabolic Panel    Collection Time: 04/28/22 11:24 AM   Result Value Ref Range    Sodium 141 136 - 145 mmol/L    Potassium 4.3 3.5 - 5.0 mmol/L    Chloride 103 98 - 111 mmol/L    CO2 24 22 - 29 mmol/L    Anion Gap 14 7 - 19 mmol/L    Glucose 105 74 - 109 mg/dL    BUN 12 6 - 20 mg/dL    CREATININE 0.9 0.5 - 1.2 mg/dL    GFR Non-African American >60 >60    GFR African American >59 >59    Calcium 9.6 8.6 - 10.0 mg/dL    Total Protein 7.1 6.6 - 8.7 g/dL    Albumin 4.6 3.5 - 5.2 g/dL    Total Bilirubin 0.5 0.2 - 1.2 mg/dL    Alkaline Phosphatase 72 40 - 130 U/L    ALT 21 5 - 41 U/L    AST 21 5 - 40 U/L       Lab Results   Component Value Date    CHOL 140 (L) 10/28/2021    CHOL 193 09/29/2020    CHOL 143 (L) 07/06/2020     Lab Results   Component Value Date    TRIG 79 10/28/2021    TRIG 126 09/29/2020    TRIG 172 (H) 07/06/2020     Lab Results   Component Value Date    HDL 43 (L) 10/28/2021    HDL 45 (L) 09/29/2020    HDL 41 (L) 07/06/2020     Lab Results   Component Value Date    LDLCALC 81 10/28/2021    [address] 09/29/2020 [address] 07/06/2020     No results found for: LABVLDL, VLDL  No results found for: [address]    Lab Results   Component Value Date    PSA 0.77 04/28/2022    PSA 0.69 07/06/2020    PSA 1.58 02/08/2019           Assessment & Plan: The following diagnoses and conditions are stable with no further orders unless indicated:  1. Essential (primary) hypertension  BP Readings from Last 3 Encounters:   05/09/22 126/78   11/02/21 130/82   04/29/21 132/82     Stable  Continue with lisinopril 10 mg once a day      2. Familial hypercholesteremia  Lab Results   Component Value Date    CHOL 140 (L) 10/28/2021    CHOL 193 09/29/2020    CHOL 143 (L) 07/06/2020     Lab Results   Component Value Date    TRIG 79 10/28/2021    TRIG 126 09/29/2020    TRIG 172 (H) 07/06/2020     Lab Results   Component Value Date    HDL 43 (L) 10/28/2021    HDL 45 (L) 09/29/2020    HDL 41 (L) 07/06/2020     Lab Results   Component Value Date    LDLCALC 81 10/28/2021    [address] 09/29/2020 [address] 07/06/2020     No results found for: LABVLDL, VLDL  No results found for: CHOLHDLRATIO  Recheck next visit. Continue with crestor. 3.  Chronic insomnia  We have tried trazodone, lunesta, and restoril 15 mg. Increase restoril to 30 mg       4. Anxiety  Discussed potentially starting an SSRI and counseling. He states he will consider    Return in about 3 months (around 8/9/2022) for Routine follow up - 15 minute visit. Discussed use, benefit, and side effects of prescribed medications. All patient questions answered. Pt voiced understanding. Reviewed health maintenance.   Instructedto continue current medications, diet and exercise. Patient agreed with treatmentplan. Follow up as directed.     _______________________________________________________________      Past Medical History:   Diagnosis Date    Hypertension       Past Surgical History:   Procedure Laterality Date    COLONOSCOPY N/A 6/19/2019    Dr Alfred Canales, 10 yr recall    FOOT NEUROMA SURGERY Left 2013       Family History   Problem Relation Age of Onset    No Known Problems Mother     Heart Disease Father     High Cholesterol Brother        Social History     Tobacco Use    Smoking status: Never Smoker    Smokeless tobacco: Never Used   Substance Use Topics    Alcohol use: Yes     Comment: social     Current Outpatient Medications   Medication Sig Dispense Refill    lisinopril (PRINIVIL;ZESTRIL) 10 MG tablet Take 1 tablet by mouth daily 90 tablet 3    rosuvastatin (CRESTOR) 5 MG tablet TAKE 1 TABLET BY MOUTH ONCE DAILY 90 tablet 3    temazepam (RESTORIL) 30 MG capsule Take 1 capsule by mouth nightly as needed for Sleep for up to 30 days. 30 capsule 5    meloxicam (MOBIC) 15 MG tablet TAKE 1 TABLET BY MOUTH ONCE DAILY 90 tablet 0     No current facility-administered medications for this visit.      No Known Allergies    Health Maintenance   Topic Date Due    HIV screen  Never done    Hepatitis C screen  Never done    Shingles vaccine (1 of 2) Never done    COVID-19 Vaccine (3 - Booster) 09/14/2021    Flu vaccine (Season Ended) 09/01/2022    Lipids  10/28/2022    DTaP/Tdap/Td vaccine (2 - Td or Tdap) 03/15/2023    Depression Screen  05/09/2023    Diabetes screen  04/28/2025    Colorectal Cancer Screen  06/19/2029    Hepatitis A vaccine  Aged Out    Hepatitis B vaccine  Aged Out    Hib vaccine  Aged Out    Meningococcal (ACWY) vaccine  Aged Out    Pneumococcal 0-64 years Vaccine  Aged Out       _______________________________________________________________    Note dictated using Dragon Dictation software  Sometimes this dictation software makes erroneous transcriptions.

## 2022-05-24 DIAGNOSIS — M48.02 CERVICAL SPINAL STENOSIS: ICD-10-CM

## 2022-05-24 RX ORDER — HYDROCODONE BITARTRATE AND ACETAMINOPHEN 7.5; 325 MG/1; MG/1
1 TABLET ORAL EVERY 6 HOURS PRN
Qty: 12 TABLET | Refills: 0 | Status: SHIPPED | OUTPATIENT
Start: 2022-05-24 | End: 2022-05-27

## 2022-05-24 NOTE — TELEPHONE ENCOUNTER
Norm Last called to request a refill on his medication. Last office visit : 5/9/2022   Next office visit : 8/9/2022     Last UDS: No results found for: Lucero Pen, LABBENZ, BUPRENUR, COCAIMETSCRU, GABAPENTIN, MDMA, METAMPU, OPIATESCREENURINE, OXTCOSU, PHENCYCLIDINESCREENURINE, PROPOXYPHENE, THCSCREENUR, TRICYUR    Jose E Vargas: 5/24/22  Medication Contract: Needs at next apt   Last Fill: 4/22/22    Requested Prescriptions     Pending Prescriptions Disp Refills    HY[facility]aminophen [facility]) 7.5-325 MG per tablet 12 tablet 0     Sig: Take 1 tablet by mouth every 6 hours as needed for Pain for up to 3 days. Please approve or refuse this medication.    Carlos Glynn

## 2022-06-24 DIAGNOSIS — M48.02 CERVICAL SPINAL STENOSIS: ICD-10-CM

## 2022-06-24 RX ORDER — HYDROCODONE BITARTRATE AND ACETAMINOPHEN 7.5; 325 MG/1; MG/1
1 TABLET ORAL EVERY 6 HOURS PRN
Qty: 12 TABLET | Refills: 0 | Status: SHIPPED | OUTPATIENT
Start: 2022-06-24 | End: 2022-07-21 | Stop reason: SDUPTHER

## 2022-07-21 DIAGNOSIS — M48.02 CERVICAL SPINAL STENOSIS: ICD-10-CM

## 2022-07-21 RX ORDER — HYDROCODONE BITARTRATE AND ACETAMINOPHEN 7.5; 325 MG/1; MG/1
1 TABLET ORAL EVERY 6 HOURS PRN
Qty: 12 TABLET | Refills: 0 | Status: SHIPPED | OUTPATIENT
Start: 2022-07-21 | End: 2022-08-22 | Stop reason: SDUPTHER

## 2022-07-21 NOTE — TELEPHONE ENCOUNTER
Saurabh Goncalves called to request a refill on his medication. Last office visit : 5/9/2022   Next office visit : 8/9/2022     Last UDS: No results found for: [employer], LABBENZ, BUPRENUR, COCAIMETSCRU, GABAPENTIN, MDMA, METAMPU, OPIATESCREENURINE, OXTCOSU, PHENCYCLIDINESCREENURINE, PROPOXYPHENE, THCSCREENUR, TRICYUR    Last Humberto Bryson: 6/24/22  Medication Contract: None on file   Last Fill: 6/24/22    Requested Prescriptions     Pending Prescriptions Disp Refills    HYDROcodone-acetaminophen (NORCO) 7.5-325 MG per tablet 12 tablet 0     Sig: Take 1 tablet by mouth every 6 hours as needed for Pain for up to 3 days. Please approve or refuse this medication.    Denis Martinez

## 2022-08-09 ENCOUNTER — OFFICE VISIT (OUTPATIENT)
Dept: FAMILY MEDICINE CLINIC | Age: 57
End: 2022-08-09
Payer: COMMERCIAL

## 2022-08-09 VITALS
WEIGHT: 200 LBS | TEMPERATURE: 98.2 F | HEART RATE: 79 BPM | DIASTOLIC BLOOD PRESSURE: 82 MMHG | OXYGEN SATURATION: 98 % | SYSTOLIC BLOOD PRESSURE: 126 MMHG | BODY MASS INDEX: 31.32 KG/M2

## 2022-08-09 DIAGNOSIS — M48.02 CERVICAL SPINAL STENOSIS: ICD-10-CM

## 2022-08-09 DIAGNOSIS — Z51.81 MEDICATION MONITORING ENCOUNTER: ICD-10-CM

## 2022-08-09 DIAGNOSIS — F51.01 PRIMARY INSOMNIA: ICD-10-CM

## 2022-08-09 DIAGNOSIS — E78.01 FAMILIAL HYPERCHOLESTEREMIA: ICD-10-CM

## 2022-08-09 DIAGNOSIS — R73.9 HYPERGLYCEMIA: ICD-10-CM

## 2022-08-09 DIAGNOSIS — G89.29 CHRONIC RIGHT SHOULDER PAIN: ICD-10-CM

## 2022-08-09 DIAGNOSIS — I10 ESSENTIAL (PRIMARY) HYPERTENSION: Primary | ICD-10-CM

## 2022-08-09 DIAGNOSIS — M25.511 CHRONIC RIGHT SHOULDER PAIN: ICD-10-CM

## 2022-08-09 PROCEDURE — G8417 CALC BMI ABV UP PARAM F/U: HCPCS | Performed by: FAMILY MEDICINE

## 2022-08-09 PROCEDURE — 99214 OFFICE O/P EST MOD 30 MIN: CPT | Performed by: FAMILY MEDICINE

## 2022-08-09 PROCEDURE — 1036F TOBACCO NON-USER: CPT | Performed by: FAMILY MEDICINE

## 2022-08-09 PROCEDURE — 80305 DRUG TEST PRSMV DIR OPT OBS: CPT | Performed by: FAMILY MEDICINE

## 2022-08-09 PROCEDURE — 3017F COLORECTAL CA SCREEN DOC REV: CPT | Performed by: FAMILY MEDICINE

## 2022-08-09 PROCEDURE — G8427 DOCREV CUR MEDS BY ELIG CLIN: HCPCS | Performed by: FAMILY MEDICINE

## 2022-08-09 RX ORDER — MELOXICAM 15 MG/1
15 TABLET ORAL DAILY
Qty: 90 TABLET | Refills: 1 | Status: SHIPPED | OUTPATIENT
Start: 2022-08-09

## 2022-08-09 NOTE — PATIENT INSTRUCTIONS
We are committed to providing you with the best care possible. In order to help us achieve these goals please remember to bring all medications, herbal products, and over the counter supplements with you to each visit. If your provider has ordered testing for you, please be sure to follow up with our office if you have not received results within 7 days after the testing took place. *If you receive a survey after visiting one of our offices, please take time to share your experience concerning your physician office visit. These surveys are confidential and no health information about you is shared. We are eager to improve for you and we are counting on your feedback to help make that happen.       _______________________________________________________________  Ask if you have a family history of bicuspic valve.

## 2022-08-09 NOTE — PROGRESS NOTES
[facility] PHYSICIAN SERVICES  [facility] FAMILY MEDICINE  [address] [address]  [facility] [place] [address]  Dept: [phone]  Dept Fax: [phone]  Loc: [phone]    Birgit Jamison is a 56 y.o. male who presents today for his medical conditions/complaints as noted below. Birgit Jamison is here for 3 Month Follow-Up        HPI:   CC: Here today to discuss the following:    He continues to have right shoulder pain. \"Not on my plate yet\". States he has pain with full abduction. Pain describes a dull ache. No numbness or tingling. No neck pain. Continues to take meloxicam for relief. Continues to have issues with insomnia. Going through a divorce. Temazepam taken as needed for insomnia. Overall satisfied with results. Hyperlipidemia  Tolerating current cholesterol medication without side effects. . Attempting to reduce processed sugar and cholesterol from diet. Hypertension  Compliant with medications. No adverse effects from medication. No lightheadedness, palpitations, or chest pain. HPI    Subjective:      Review of Systems    Review of Systems   Constitutional: Negative for chills and fever. HENT: Negative for congestion. Respiratory: Negative for cough, chest tightness and shortness of breath. Cardiovascular: Negative for chest pain, palpitations and leg swelling. Gastrointestinal: Negative for abdominal pain, anal bleeding, constipation, diarrhea and nausea. Genitourinary: Negative for difficulty urinating. Psychiatric/Behavioral: Negative. SeeHPI for visit specific review of symptoms. All others negative      Objective:   /82   Pulse 79   Temp 98.2 °F (36.8 °C)   Wt 200 lb (90.7 kg)   SpO2 98%   BMI 31.32 kg/m²   Physical Exam    Physical Exam   Constitutional: He appears well-developed. Does not appear ill. Neck: Neck supple. No masses. Neck Symmetric. Normal tracheal position.   No thyroid enlargement  Cardiovascular: Normal rate and regular rhythm. Exam reveals no friction rub. No murmur heard. Respiratory:  Effort normal and breath sounds normal. No respiratory distress. No wheezes. No rales. No use of accessory muscles or intercostal retractions. Neurological: alert. Psychiatric: normal mood and affect. His behavior is normal.   MSK: Full range of motion right shoulder. Signs of impingement of the right shoulder  Recent Results (from the past 672 hour(s))   POCT Rapid Drug Screen    Collection Time: 08/09/22 12:00 AM   Result Value Ref Range    Alcohol, Urine neg     Amphetamine Screen, Urine neg     Barbiturate Screen, Urine neg     Benzodiazepine Screen, Urine pos     Buprenorphine Urine neg     Cocaine Metabolite Screen, Urine neg     FENTANYL SCREEN, URINE neg     Gabapentin Screen, Urine neg     MDMA, Urine neg     Methadone Screen, Urine neg     Methamphetamine, Urine neg     Opiate Scrn, Ur pos     Oxycodone Screen, Ur neg     PCP Screen, Urine neg     Propoxyphene Screen, Urine neg     Synthetic Cannabinoids (K2) Screen, Urine neg     THC Screen, Urine pos     Tramadol Scrn, Ur neg     Tricyclic Antidepressants, Urine neg                Assessment & Plan: The following diagnoses and conditions are stable with no further orders unless indicated:  1. Essential (primary) hypertension  BP Readings from Last 3 Encounters:   08/09/22 126/82   05/09/22 126/78   11/02/21 130/82   Stable on lisinopril 10 mg daily    - CBC; Future  Chronic issue stable  2.  Familial hypercholesteremia  Lab Results   Component Value Date    CHOL 140 (L) 10/28/2021    CHOL 193 09/29/2020    CHOL 143 (L) 07/06/2020     Lab Results   Component Value Date    TRIG 79 10/28/2021    TRIG 126 09/29/2020    TRIG 172 (H) 07/06/2020     Lab Results   Component Value Date    HDL 43 (L) 10/28/2021    HDL 45 (L) 09/29/2020    HDL 41 (L) 07/06/2020     Lab Results   Component Value Date    LDLCALC 81 10/28/2021    LDLCALC 123 09/29/2020    [address] 07/06/2020 No results found for: LABVLDL, VLDL  No results found for: CHOLHDLRATIO  Recheck next visit  - Lipid Panel; Future    3. Hyperglycemia  Lab Results   Component Value Date    LABA1C 5.4 04/28/2022     Lab Results   Component Value Date    LDLCALC 81 10/28/2021    CREATININE 0.9 04/28/2022     Recheck next visit encourage lifestyle modification  - Hemoglobin A1C; Future  - Comprehensive Metabolic Panel; Future    4. Medication monitoring encounter  Controlled substance agreement for medicines for insomnia  - POCT Rapid Drug Screen    5. Chronic insomnia  Continue with temazepam.  Chronic issue stable    6. Right shoulder pain/cervical spinal stenosis  Most of his pain is located in the right shoulder. Not experiencing significant neck pain today. Adequate relief with meloxicam.  Refilled medication  Recommended referral to orthopedist for consideration of steroid shot if pain worsens  The neck pain is chronic the right shoulder is a new issue  Return in about 4 months (around 12/9/2022) for Routine follow up - 20 minutes. Discussed use, benefit, and side effects of prescribed medications. All patient questions answered. Pt voiced understanding. Reviewed health maintenance. Instructedto continue current medications, diet and exercise. Patient agreed with treatmentplan.  Follow up as directed.     _______________________________________________________________      Past Medical History:   Diagnosis Date    Hypertension       Past Surgical History:   Procedure Laterality Date    COLONOSCOPY N/A 6/19/2019    Dr Codie Mcwilliams, 10 yr recall    FOOT NEUROMA SURGERY Left 2013       Family History   Problem Relation Age of Onset    No Known Problems Mother     Heart Disease Father     High Cholesterol Brother        Social History     Tobacco Use    Smoking status: Never    Smokeless tobacco: Never   Substance Use Topics    Alcohol use: Yes     Comment: social     Current Outpatient Medications   Medication Sig Dispense Refill    meloxicam (MOBIC) 15 MG tablet Take 1 tablet by mouth in the morning. 90 tablet 1    lisinopril (PRINIVIL;ZESTRIL) 10 MG tablet Take 1 tablet by mouth daily 90 tablet 3    rosuvastatin (CRESTOR) 5 MG tablet TAKE 1 TABLET BY MOUTH ONCE DAILY 90 tablet 3     No current facility-administered medications for this visit. No Known Allergies    Health Maintenance   Topic Date Due    HIV screen  Never done    Hepatitis C screen  Never done    Shingles vaccine (1 of 2) Never done    COVID-19 Vaccine (3 - Booster) 09/14/2021    Flu vaccine (1) 09/01/2022    Lipids  10/28/2022    DTaP/Tdap/Td vaccine (2 - Td or Tdap) 03/15/2023    Prostate Specific Antigen (PSA) Screening or Monitoring  04/28/2023    Depression Screen  05/09/2023    Diabetes screen  04/28/2025    Colorectal Cancer Screen  06/19/2029    Hepatitis A vaccine  Aged Out    Hepatitis B vaccine  Aged Out    Hib vaccine  Aged Out    Meningococcal (ACWY) vaccine  Aged Out    Pneumococcal 0-64 years Vaccine  Aged Out       _______________________________________________________________    Note dictated using Dragon Dictation software  Sometimes this dictation software makes erroneous transcriptions.

## 2022-08-22 DIAGNOSIS — M48.02 CERVICAL SPINAL STENOSIS: ICD-10-CM

## 2022-08-22 RX ORDER — HYDROCODONE BITARTRATE AND ACETAMINOPHEN 7.5; 325 MG/1; MG/1
1 TABLET ORAL EVERY 6 HOURS PRN
Qty: 12 TABLET | Refills: 0 | Status: SHIPPED | OUTPATIENT
Start: 2022-08-22 | End: 2022-09-21 | Stop reason: SDUPTHER

## 2022-08-22 NOTE — TELEPHONE ENCOUNTER
Saurabh Goncalves called to request a refill on his medication. Last office visit : 8/9/2022   Next office visit : 12/9/2022     Last UDS:   Amphetamine Screen, Urine   Date Value Ref Range Status   08/09/2022 neg  Final     Barbiturate Screen, Urine   Date Value Ref Range Status   08/09/2022 neg  Final     Benzodiazepine Screen, Urine   Date Value Ref Range Status   08/09/2022 pos  Final     Buprenorphine Urine   Date Value Ref Range Status   08/09/2022 neg  Final     Cocaine Metabolite Screen, Urine   Date Value Ref Range Status   08/09/2022 neg  Final     Gabapentin Screen, Urine   Date Value Ref Range Status   08/09/2022 neg  Final     MDMA, Urine   Date Value Ref Range Status   08/09/2022 neg  Final     Methamphetamine, Urine   Date Value Ref Range Status   08/09/2022 neg  Final     Opiate Scrn, Ur   Date Value Ref Range Status   08/09/2022 pos  Final     Oxycodone Screen, Ur   Date Value Ref Range Status   08/09/2022 neg  Final     PCP Screen, Urine   Date Value Ref Range Status   08/09/2022 neg  Final     Propoxyphene Screen, Urine   Date Value Ref Range Status   08/09/2022 neg  Final     THC Screen, Urine   Date Value Ref Range Status   08/09/2022 pos  Final     Tricyclic Antidepressants, Urine   Date Value Ref Range Status   08/09/2022 neg  Final       Last Humberto Bryson: 6/24/22  Medication Contract: 8/9/22   Last Fill: 7/21/22      Requested Prescriptions     Pending Prescriptions Disp Refills    HYDROcodone-acetaminophen (NORCO) 7.5-325 MG per tablet 12 tablet 0     Sig: Take 1 tablet by mouth every 6 hours as needed for Pain for up to 3 days. Please approve or refuse this medication.    Denis Martinez

## 2022-09-21 DIAGNOSIS — M48.02 CERVICAL SPINAL STENOSIS: ICD-10-CM

## 2022-09-21 RX ORDER — HYDROCODONE BITARTRATE AND ACETAMINOPHEN 7.5; 325 MG/1; MG/1
1 TABLET ORAL EVERY 6 HOURS PRN
Qty: 12 TABLET | Refills: 0 | Status: SHIPPED | OUTPATIENT
Start: 2022-09-21 | End: 2022-10-19 | Stop reason: SDUPTHER

## 2022-09-21 NOTE — TELEPHONE ENCOUNTER
Farideh Nice called to request a refill on his medication. Last office visit : 8/9/2022   Next office visit : 12/9/2022     Last UDS:   Amphetamine Screen, Urine   Date Value Ref Range Status   08/09/2022 neg  Final     Barbiturate Screen, Urine   Date Value Ref Range Status   08/09/2022 neg  Final     Benzodiazepine Screen, Urine   Date Value Ref Range Status   08/09/2022 pos  Final     Buprenorphine Urine   Date Value Ref Range Status   08/09/2022 neg  Final     Cocaine Metabolite Screen, Urine   Date Value Ref Range Status   08/09/2022 neg  Final     Gabapentin Screen, Urine   Date Value Ref Range Status   08/09/2022 neg  Final     MDMA, Urine   Date Value Ref Range Status   08/09/2022 neg  Final     Methamphetamine, Urine   Date Value Ref Range Status   08/09/2022 neg  Final     Opiate Scrn, Ur   Date Value Ref Range Status   08/09/2022 pos  Final     Oxycodone Screen, Ur   Date Value Ref Range Status   08/09/2022 neg  Final     PCP Screen, Urine   Date Value Ref Range Status   08/09/2022 neg  Final     Propoxyphene Screen, Urine   Date Value Ref Range Status   08/09/2022 neg  Final     THC Screen, Urine   Date Value Ref Range Status   08/09/2022 pos  Final     Tricyclic Antidepressants, Urine   Date Value Ref Range Status   08/09/2022 neg  Final       Last Edwige Platts: 6/24/22  Medication Contract: 8/9/22   Last Fill: 8/22/22    Requested Prescriptions     Pending Prescriptions Disp Refills    HYDROcodone-acetaminophen (NORCO) 7.5-325 MG per tablet 12 tablet 0     Sig: Take 1 tablet by mouth every 6 hours as needed for Pain for up to 3 days. Please approve or refuse this medication.    Tatiana Arnold MA

## 2022-10-18 DIAGNOSIS — M48.02 CERVICAL SPINAL STENOSIS: ICD-10-CM

## 2022-10-18 NOTE — TELEPHONE ENCOUNTER
Kj Blair called to request a refill on his medication. Last office visit : 8/9/2022   Next office visit : 12/9/2022     Last UDS:   Amphetamine Screen, Urine   Date Value Ref Range Status   08/09/2022 neg  Final     Barbiturate Screen, Urine   Date Value Ref Range Status   08/09/2022 neg  Final     Benzodiazepine Screen, Urine   Date Value Ref Range Status   08/09/2022 pos  Final     Buprenorphine Urine   Date Value Ref Range Status   08/09/2022 neg  Final     Cocaine Metabolite Screen, Urine   Date Value Ref Range Status   08/09/2022 neg  Final     Gabapentin Screen, Urine   Date Value Ref Range Status   08/09/2022 neg  Final     MDMA, Urine   Date Value Ref Range Status   08/09/2022 neg  Final     Methamphetamine, Urine   Date Value Ref Range Status   08/09/2022 neg  Final     Opiate Scrn, Ur   Date Value Ref Range Status   08/09/2022 pos  Final     Oxycodone Screen, Ur   Date Value Ref Range Status   08/09/2022 neg  Final     PCP Screen, Urine   Date Value Ref Range Status   08/09/2022 neg  Final     Propoxyphene Screen, Urine   Date Value Ref Range Status   08/09/2022 neg  Final     THC Screen, Urine   Date Value Ref Range Status   08/09/2022 pos  Final     Tricyclic Antidepressants, Urine   Date Value Ref Range Status   08/09/2022 neg  Final       Last Nimesh Reid: 10/18/22  Medication Contract: None on file   Last Fill: 9/21/22    Requested Prescriptions     Pending Prescriptions Disp Refills    HYDROcodone-acetaminophen (NORCO) 7.5-325 MG per tablet 12 tablet 0     Sig: Take 1 tablet by mouth every 6 hours as needed for Pain for up to 3 days. Please approve or refuse this medication.    Jadon Mathew MA

## 2022-10-19 RX ORDER — HYDROCODONE BITARTRATE AND ACETAMINOPHEN 7.5; 325 MG/1; MG/1
1 TABLET ORAL EVERY 6 HOURS PRN
Qty: 12 TABLET | Refills: 0 | Status: SHIPPED | OUTPATIENT
Start: 2022-10-19 | End: 2022-10-22

## 2022-11-17 DIAGNOSIS — M48.02 CERVICAL SPINAL STENOSIS: ICD-10-CM

## 2022-11-17 RX ORDER — HYDROCODONE BITARTRATE AND ACETAMINOPHEN 7.5; 325 MG/1; MG/1
1 TABLET ORAL EVERY 6 HOURS PRN
Qty: 12 TABLET | Refills: 0 | Status: SHIPPED | OUTPATIENT
Start: 2022-11-17 | End: 2022-11-20

## 2022-11-17 NOTE — TELEPHONE ENCOUNTER
Adina Jang called to request a refill on his medication. Last office visit : 8/9/2022   Next office visit : 12/9/2022     Last UDS:   Amphetamine Screen, Urine   Date Value Ref Range Status   08/09/2022 neg  Final     Barbiturate Screen, Urine   Date Value Ref Range Status   08/09/2022 neg  Final     Benzodiazepine Screen, Urine   Date Value Ref Range Status   08/09/2022 pos  Final     Buprenorphine Urine   Date Value Ref Range Status   08/09/2022 neg  Final     Cocaine Metabolite Screen, Urine   Date Value Ref Range Status   08/09/2022 neg  Final     Gabapentin Screen, Urine   Date Value Ref Range Status   08/09/2022 neg  Final     MDMA, Urine   Date Value Ref Range Status   08/09/2022 neg  Final     Methamphetamine, Urine   Date Value Ref Range Status   08/09/2022 neg  Final     Opiate Scrn, Ur   Date Value Ref Range Status   08/09/2022 pos  Final     Oxycodone Screen, Ur   Date Value Ref Range Status   08/09/2022 neg  Final     PCP Screen, Urine   Date Value Ref Range Status   08/09/2022 neg  Final     Propoxyphene Screen, Urine   Date Value Ref Range Status   08/09/2022 neg  Final     THC Screen, Urine   Date Value Ref Range Status   08/09/2022 pos  Final     Tricyclic Antidepressants, Urine   Date Value Ref Range Status   08/09/2022 neg  Final       Last Fay Pulling: 10/18/22  Medication Contract: 8/9/22   Last Fill: 10/19/22    Requested Prescriptions     Pending Prescriptions Disp Refills    HYDROcodone-acetaminophen (NORCO) 7.5-325 MG per tablet 12 tablet 0     Sig: Take 1 tablet by mouth every 6 hours as needed for Pain for up to 3 days. Please approve or refuse this medication.    Dagoberto Goss MA

## 2022-12-01 DIAGNOSIS — E78.01 FAMILIAL HYPERCHOLESTEREMIA: ICD-10-CM

## 2022-12-01 DIAGNOSIS — I10 ESSENTIAL (PRIMARY) HYPERTENSION: ICD-10-CM

## 2022-12-01 DIAGNOSIS — R73.9 HYPERGLYCEMIA: ICD-10-CM

## 2022-12-01 LAB
ALBUMIN SERPL-MCNC: 4.8 G/DL (ref 3.5–5.2)
ALP BLD-CCNC: 62 U/L (ref 40–130)
ALT SERPL-CCNC: 42 U/L (ref 5–41)
ANION GAP SERPL CALCULATED.3IONS-SCNC: 11 MMOL/L (ref 7–19)
AST SERPL-CCNC: 35 U/L (ref 5–40)
BILIRUB SERPL-MCNC: 0.5 MG/DL (ref 0.2–1.2)
BUN BLDV-MCNC: 13 MG/DL (ref 6–20)
CALCIUM SERPL-MCNC: 9.7 MG/DL (ref 8.6–10)
CHLORIDE BLD-SCNC: 102 MMOL/L (ref 98–111)
CHOLESTEROL, TOTAL: 212 MG/DL (ref 160–199)
CO2: 28 MMOL/L (ref 22–29)
CREAT SERPL-MCNC: 0.9 MG/DL (ref 0.5–1.2)
GFR SERPL CREATININE-BSD FRML MDRD: >60 ML/MIN/{1.73_M2}
GLUCOSE BLD-MCNC: 109 MG/DL (ref 74–109)
HBA1C MFR BLD: 5.4 % (ref 4–6)
HCT VFR BLD CALC: 47.8 % (ref 42–52)
HDLC SERPL-MCNC: 59 MG/DL (ref 55–121)
HEMOGLOBIN: 16.2 G/DL (ref 14–18)
LDL CHOLESTEROL CALCULATED: 117 MG/DL
MCH RBC QN AUTO: 32.3 PG (ref 27–31)
MCHC RBC AUTO-ENTMCNC: 33.9 G/DL (ref 33–37)
MCV RBC AUTO: 95.4 FL (ref 80–94)
PDW BLD-RTO: 12.1 % (ref 11.5–14.5)
PLATELET # BLD: 259 K/UL (ref 130–400)
PMV BLD AUTO: 10 FL (ref 9.4–12.4)
POTASSIUM SERPL-SCNC: 4.5 MMOL/L (ref 3.5–5)
RBC # BLD: 5.01 M/UL (ref 4.7–6.1)
SODIUM BLD-SCNC: 141 MMOL/L (ref 136–145)
TOTAL PROTEIN: 7.4 G/DL (ref 6.6–8.7)
TRIGL SERPL-MCNC: 182 MG/DL (ref 0–149)
WBC # BLD: 8.2 K/UL (ref 4.8–10.8)

## 2022-12-06 ENCOUNTER — PATIENT MESSAGE (OUTPATIENT)
Dept: FAMILY MEDICINE CLINIC | Age: 57
End: 2022-12-06

## 2022-12-06 DIAGNOSIS — F51.04 CHRONIC INSOMNIA: ICD-10-CM

## 2022-12-06 RX ORDER — TEMAZEPAM 30 MG/1
30 CAPSULE ORAL NIGHTLY PRN
Qty: 30 CAPSULE | Refills: 5 | Status: SHIPPED | OUTPATIENT
Start: 2022-12-06 | End: 2023-01-05

## 2022-12-06 NOTE — TELEPHONE ENCOUNTER
Ping Lazcano called to request a refill on his medication. Last office visit : 8/9/2022   Next office visit : 12/9/2022     Last UDS:   Amphetamine Screen, Urine   Date Value Ref Range Status   08/09/2022 neg  Final     Barbiturate Screen, Urine   Date Value Ref Range Status   08/09/2022 neg  Final     Benzodiazepine Screen, Urine   Date Value Ref Range Status   08/09/2022 pos  Final     Buprenorphine Urine   Date Value Ref Range Status   08/09/2022 neg  Final     Cocaine Metabolite Screen, Urine   Date Value Ref Range Status   08/09/2022 neg  Final     Gabapentin Screen, Urine   Date Value Ref Range Status   08/09/2022 neg  Final     MDMA, Urine   Date Value Ref Range Status   08/09/2022 neg  Final     Methamphetamine, Urine   Date Value Ref Range Status   08/09/2022 neg  Final     Opiate Scrn, Ur   Date Value Ref Range Status   08/09/2022 pos  Final     Oxycodone Screen, Ur   Date Value Ref Range Status   08/09/2022 neg  Final     PCP Screen, Urine   Date Value Ref Range Status   08/09/2022 neg  Final     Propoxyphene Screen, Urine   Date Value Ref Range Status   08/09/2022 neg  Final     THC Screen, Urine   Date Value Ref Range Status   08/09/2022 pos  Final     Tricyclic Antidepressants, Urine   Date Value Ref Range Status   08/09/2022 neg  Final       Last Rajwinder Pandey: 10/18/22  Medication Contract: 08/09/22   Last Fill: 05/09/22    Requested Prescriptions     Pending Prescriptions Disp Refills    temazepam (RESTORIL) 30 MG capsule 30 capsule 5     Sig: Take 1 capsule by mouth nightly as needed for Sleep for up to 30 days. Please approve or refuse this medication.    Nicole Aguilar LPN

## 2022-12-06 NOTE — TELEPHONE ENCOUNTER
From: Robin Kennedy  To: Dr. Rayray Bright: 12/6/2022 12:57 AM CST  Subject: Sleep    Can I get a refill on the temazepam? Thank you.

## 2022-12-09 ENCOUNTER — OFFICE VISIT (OUTPATIENT)
Dept: FAMILY MEDICINE CLINIC | Age: 57
End: 2022-12-09
Payer: COMMERCIAL

## 2022-12-09 VITALS
BODY MASS INDEX: 34.03 KG/M2 | WEIGHT: 216.8 LBS | OXYGEN SATURATION: 97 % | DIASTOLIC BLOOD PRESSURE: 76 MMHG | HEIGHT: 67 IN | TEMPERATURE: 97.3 F | SYSTOLIC BLOOD PRESSURE: 128 MMHG | HEART RATE: 78 BPM

## 2022-12-09 DIAGNOSIS — I10 ESSENTIAL (PRIMARY) HYPERTENSION: Primary | ICD-10-CM

## 2022-12-09 DIAGNOSIS — F51.04 CHRONIC INSOMNIA: ICD-10-CM

## 2022-12-09 DIAGNOSIS — R73.9 HYPERGLYCEMIA: ICD-10-CM

## 2022-12-09 DIAGNOSIS — Z79.899 DRUG THERAPY: ICD-10-CM

## 2022-12-09 DIAGNOSIS — G89.29 CHRONIC RIGHT SHOULDER PAIN: ICD-10-CM

## 2022-12-09 DIAGNOSIS — E78.01 FAMILIAL HYPERCHOLESTEREMIA: ICD-10-CM

## 2022-12-09 DIAGNOSIS — M48.02 CERVICAL SPINAL STENOSIS: ICD-10-CM

## 2022-12-09 DIAGNOSIS — M25.511 CHRONIC RIGHT SHOULDER PAIN: ICD-10-CM

## 2022-12-09 PROCEDURE — 99214 OFFICE O/P EST MOD 30 MIN: CPT | Performed by: FAMILY MEDICINE

## 2022-12-09 PROCEDURE — 1036F TOBACCO NON-USER: CPT | Performed by: FAMILY MEDICINE

## 2022-12-09 PROCEDURE — G8427 DOCREV CUR MEDS BY ELIG CLIN: HCPCS | Performed by: FAMILY MEDICINE

## 2022-12-09 PROCEDURE — 80305 DRUG TEST PRSMV DIR OPT OBS: CPT | Performed by: FAMILY MEDICINE

## 2022-12-09 PROCEDURE — 3074F SYST BP LT 130 MM HG: CPT | Performed by: FAMILY MEDICINE

## 2022-12-09 PROCEDURE — G8417 CALC BMI ABV UP PARAM F/U: HCPCS | Performed by: FAMILY MEDICINE

## 2022-12-09 PROCEDURE — G8484 FLU IMMUNIZE NO ADMIN: HCPCS | Performed by: FAMILY MEDICINE

## 2022-12-09 PROCEDURE — 3017F COLORECTAL CA SCREEN DOC REV: CPT | Performed by: FAMILY MEDICINE

## 2022-12-09 PROCEDURE — 3078F DIAST BP <80 MM HG: CPT | Performed by: FAMILY MEDICINE

## 2022-12-09 NOTE — PROGRESS NOTES
[facility] PHYSICIAN SERVICES  [facility]  [address]  [facility] [place] [address]  Dept: [phone]  Dept Fax: [phone]  Loc: [phone]    Anna Eastman is a 56 y.o. male who presents today for his medical conditions/complaints as noted below. Anna Eastman is here for Follow-up        HPI:   CC: Here today to discuss the following:    Hypertension  Compliant with medications. No adverse effects from medication. No lightheadedness, palpitations, or chest pain. Hyperlipidemia  Tolerating current cholesterol medication without side effects. . Attempting to reduce processed sugar and cholesterol from diet. Insomnia  Currently stable. Satisfied with current treatment regimen. No adverse effects from medication. HPI    Subjective:      Review of Systems  Review of Systems   Constitutional: Negative for chills and fever. HENT: Negative for congestion. Respiratory: Negative for cough, chest tightness and shortness of breath. Cardiovascular: Negative for chest pain, palpitations and leg swelling. Gastrointestinal: Negative for abdominal pain, anal bleeding, constipation, diarrhea and nausea. Genitourinary: Negative for difficulty urinating. Psychiatric/Behavioral: Negative. SeeHPI for visit specific review of symptoms. All others negative      Objective:   /76 (Site: Left Upper Arm, Position: Sitting)   Pulse 78   Temp 97.3 °F (36.3 °C) (Temporal)   Ht 5' 7\" (1.702 m)   Wt 216 lb 12.8 oz (98.3 kg)   SpO2 97%   BMI 33.96 kg/m²   Physical Exam  Physical Exam   Constitutional: He appears well-developed. Does not appear ill. Neck: Neck supple. No masses. Neck Symmetric. Normal tracheal position. No thyroid enlargement  Cardiovascular: Normal rate and regular rhythm. Exam reveals no friction rub. No murmur heard. Respiratory:  Effort normal and breath sounds normal. No respiratory distress. No wheezes. No rales.  No use of accessory muscles or intercostal retractions. Neurological: alert. Psychiatric: normal mood and affect.  His behavior is normal.       Recent Results (from the past 672 hour(s))   CBC    Collection Time: 12/01/22  8:51 AM   Result Value Ref Range    WBC 8.2 4.8 - 10.8 K/uL    RBC 5.01 4.70 - 6.10 M/uL    Hemoglobin 16.2 14.0 - 18.0 g/dL    Hematocrit 47.8 42.0 - 52.0 %    MCV 95.4 (H) 80.0 - 94.0 fL    MCH 32.3 (H) 27.0 - 31.0 pg    MCHC 33.9 33.0 - 37.0 g/dL    RDW 12.1 11.5 - 14.5 %    Platelets 080 602 - 844 K/uL    MPV 10.0 9.4 - 12.4 fL   Lipid Panel    Collection Time: 12/01/22  8:51 AM   Result Value Ref Range    Cholesterol, Total 212 (H) 160 - 199 mg/dL    Triglycerides 182 (H) 0 - 149 mg/dL    HDL 59 55 - 121 mg/dL    LDL Calculated 117 <100 mg/dL   Comprehensive Metabolic Panel    Collection Time: 12/01/22  8:51 AM   Result Value Ref Range    Sodium 141 136 - 145 mmol/L    Potassium 4.5 3.5 - 5.0 mmol/L    Chloride 102 98 - 111 mmol/L    CO2 28 22 - 29 mmol/L    Anion Gap 11 7 - 19 mmol/L    Glucose 109 74 - 109 mg/dL    BUN 13 6 - 20 mg/dL    Creatinine 0.9 0.5 - 1.2 mg/dL    Est, Glom Filt Rate >60 >60    Calcium 9.7 8.6 - 10.0 mg/dL    Total Protein 7.4 6.6 - 8.7 g/dL    Albumin 4.8 3.5 - 5.2 g/dL    Total Bilirubin 0.5 0.2 - 1.2 mg/dL    Alkaline Phosphatase 62 40 - 130 U/L    ALT 42 (H) 5 - 41 U/L    AST 35 5 - 40 U/L   Hemoglobin A1C    Collection Time: 12/01/22  8:51 AM   Result Value Ref Range    Hemoglobin A1C 5.4 4.0 - 6.0 %   POCT Rapid Drug Screen    Collection Time: 12/09/22 12:00 AM   Result Value Ref Range    Alcohol, Urine neg     Amphetamine Screen, Urine neg     Barbiturate Screen, Urine neg     Benzodiazepine Screen, Urine pos     Buprenorphine Urine neg     Cocaine Metabolite Screen, Urine neg     FENTANYL SCREEN, URINE neg     Gabapentin Screen, Urine neg     MDMA, Urine neg     Methadone Screen, Urine neg     Methamphetamine, Urine neg     Opiate Scrn, Ur pos     Oxycodone Screen, Ur neg     PCP Screen, Urine neg     Propoxyphene Screen, Urine neg     Synthetic Cannabinoids (K2) Screen, Urine neg     THC Screen, Urine neg     Tramadol Scrn, Ur neg     Tricyclic Antidepressants, Urine neg        Lab Results   Component Value Date    CHOL 212 (H) 12/01/2022    CHOL 140 (L) 10/28/2021    CHOL 193 09/29/2020     Lab Results   Component Value Date    TRIG 182 (H) 12/01/2022    TRIG 79 10/28/2021    TRIG 126 09/29/2020     Lab Results   Component Value Date    HDL 59 12/01/2022    HDL 43 (L) 10/28/2021    HDL 45 (L) 09/29/2020     Lab Results   Component Value Date    LDLCALC 117 12/01/2022    LDLCALC 81 10/28/2021    LDLCALC 123 09/29/2020     No results found for: LABVLDL, VLDL  No results found for: [facility]  Lab Results   Component Value Date    LABA1C 5.4 12/01/2022    LABA1C 5.4 04/28/2022     Lab Results   Component Value Date    LDLCALC 117 12/01/2022    CREATININE 0.9 12/01/2022             Assessment & Plan: The following diagnoses and conditions are stable with no further orders unless indicated:  1. Essential (primary) hypertension  BP Readings from Last 3 Encounters:   12/09/22 128/76   08/09/22 126/82   05/09/22 126/78     Continue with lisinopril daily. 2. Familial hypercholesteremia  Lab Results   Component Value Date    CHOL 212 (H) 12/01/2022    CHOL 140 (L) 10/28/2021    CHOL 193 09/29/2020     Lab Results   Component Value Date    TRIG 182 (H) 12/01/2022    TRIG 79 10/28/2021    TRIG 126 09/29/2020     Lab Results   Component Value Date    HDL 59 12/01/2022    HDL 43 (L) 10/28/2021    HDL 45 (L) 09/29/2020     Lab Results   Component Value Date    LDLCALC 117 12/01/2022    LDLCALC 81 10/28/2021    LDLCALC 123 09/29/2020     No results found for: LABVLDL, VLDL  No results found for: CHOLHDLRATIO  Cholesterol has crept up. Will continue with Crestor encourage lifestyle modification.   We will recheck cholesterol and if elevated will consider increasing Crestor    3. Chronic insomnia  Continue with temazepam    4. Hyperglycemia  Lab Results   Component Value Date    LABA1C 5.4 12/01/2022    LABA1C 5.4 04/28/2022     Lab Results   Component Value Date    LDLCALC 117 12/01/2022    CREATININE 0.9 12/01/2022     stable    5. Cervical spinal stenosis  6. Chronic right shoulder pain  Stable on meloxicam satisfied with results        Orders Placed This Encounter   Procedures    Lipid Panel     Standing Status:   Future     Standing Expiration Date:   12/9/2023     Order Specific Question:   Is Patient Fasting?/# of Hours     Answer:   12    POCT Rapid Drug Screen           Return in about 6 months (around 6/9/2023) for Routine follow up - 20 minutes. Discussed use, benefit, and side effects of prescribed medications. All patient questions answered. Pt voiced understanding. Reviewed health maintenance. Instructedto continue current medications, diet and exercise. Patient agreed with treatmentplan. Follow up as directed.     _______________________________________________________________      Past Medical History:   Diagnosis Date    Hypertension       Past Surgical History:   Procedure Laterality Date    COLONOSCOPY N/A 6/19/2019    Dr Dann Mary, 10 yr recall    FOOT NEUROMA SURGERY Left 2013       Family History   Problem Relation Age of Onset    No Known Problems Mother     Heart Disease Father     High Cholesterol Brother        Social History     Tobacco Use    Smoking status: Never    Smokeless tobacco: Never   Substance Use Topics    Alcohol use: Yes     Comment: social     Current Outpatient Medications   Medication Sig Dispense Refill    temazepam (RESTORIL) 30 MG capsule Take 1 capsule by mouth nightly as needed for Sleep for up to 30 days. 30 capsule 5    meloxicam (MOBIC) 15 MG tablet Take 1 tablet by mouth in the morning.  90 tablet 1    lisinopril (PRINIVIL;ZESTRIL) 10 MG tablet Take 1 tablet by mouth daily 90 tablet 3    rosuvastatin (CRESTOR) 5 MG tablet TAKE 1 TABLET BY MOUTH ONCE DAILY 90 tablet 3     No current facility-administered medications for this visit. No Known Allergies    Health Maintenance   Topic Date Due    HIV screen  Never done    Hepatitis C screen  Never done    Shingles vaccine (1 of 2) Never done    COVID-19 Vaccine (3 - Booster) 06/09/2021    Flu vaccine (1) 08/01/2022    DTaP/Tdap/Td vaccine (2 - Td or Tdap) 03/15/2023    Depression Screen  05/09/2023    Lipids  12/01/2023    Diabetes screen  12/01/2025    Colorectal Cancer Screen  06/19/2029    Hepatitis A vaccine  Aged Out    Hib vaccine  Aged Out    Meningococcal (ACWY) vaccine  Aged Out    Pneumococcal 0-64 years Vaccine  Aged Out       _______________________________________________________________    Note dictated using Dragon Dictation software  Sometimes this dictation software makes erroneous transcriptions.

## 2022-12-15 DIAGNOSIS — M48.02 CERVICAL SPINAL STENOSIS: ICD-10-CM

## 2022-12-15 RX ORDER — HYDROCODONE BITARTRATE AND ACETAMINOPHEN 7.5; 325 MG/1; MG/1
1 TABLET ORAL EVERY 6 HOURS PRN
Qty: 30 TABLET | Refills: 0 | Status: SHIPPED | OUTPATIENT
Start: 2022-12-15 | End: 2023-01-16 | Stop reason: SDUPTHER

## 2023-01-16 DIAGNOSIS — M48.02 CERVICAL SPINAL STENOSIS: ICD-10-CM

## 2023-01-16 RX ORDER — HYDROCODONE BITARTRATE AND ACETAMINOPHEN 7.5; 325 MG/1; MG/1
1 TABLET ORAL EVERY 6 HOURS PRN
Qty: 30 TABLET | Refills: 0 | Status: SHIPPED | OUTPATIENT
Start: 2023-01-16 | End: 2023-02-15

## 2023-01-16 NOTE — TELEPHONE ENCOUNTER
Naida Figures called to request a refill on his medication. Last office visit : 12/9/22      Next office visit : 6/9/2023     Last UDS:   Amphetamine Screen, Urine   Date Value Ref Range Status   12/09/2022 neg  Final     Barbiturate Screen, Urine   Date Value Ref Range Status   12/09/2022 neg  Final     Benzodiazepine Screen, Urine   Date Value Ref Range Status   12/09/2022 pos  Final     Buprenorphine Urine   Date Value Ref Range Status   12/09/2022 neg  Final     Cocaine Metabolite Screen, Urine   Date Value Ref Range Status   12/09/2022 neg  Final     Gabapentin Screen, Urine   Date Value Ref Range Status   12/09/2022 neg  Final     MDMA, Urine   Date Value Ref Range Status   12/09/2022 neg  Final     Methamphetamine, Urine   Date Value Ref Range Status   12/09/2022 neg  Final     Opiate Scrn, Ur   Date Value Ref Range Status   12/09/2022 pos  Final     Oxycodone Screen, Ur   Date Value Ref Range Status   12/09/2022 neg  Final     PCP Screen, Urine   Date Value Ref Range Status   12/09/2022 neg  Final     Propoxyphene Screen, Urine   Date Value Ref Range Status   12/09/2022 neg  Final     THC Screen, Urine   Date Value Ref Range Status   12/09/2022 neg  Final     Tricyclic Antidepressants, Urine   Date Value Ref Range Status   12/09/2022 neg  Final       Last Naida Vinicio: 10/18/22  Medication Contract: 8/9/22   Last Fill: 12/15/22    Requested Prescriptions     Pending Prescriptions Disp Refills    HYDROcodone-acetaminophen (NORCO) 7.5-325 MG per tablet 30 tablet 0     Sig: Take 1 tablet by mouth every 6 hours as needed for Pain for up to 30 days. Please approve or refuse this medication.    Jonathan Echavarria

## 2023-02-14 DIAGNOSIS — M48.02 CERVICAL SPINAL STENOSIS: ICD-10-CM

## 2023-02-16 RX ORDER — HYDROCODONE BITARTRATE AND ACETAMINOPHEN 7.5; 325 MG/1; MG/1
1 TABLET ORAL EVERY 6 HOURS PRN
Qty: 30 TABLET | Refills: 0 | Status: SHIPPED | OUTPATIENT
Start: 2023-02-16 | End: 2023-03-18

## 2023-03-15 DIAGNOSIS — M48.02 CERVICAL SPINAL STENOSIS: ICD-10-CM

## 2023-03-15 RX ORDER — HYDROCODONE BITARTRATE AND ACETAMINOPHEN 7.5; 325 MG/1; MG/1
1 TABLET ORAL EVERY 6 HOURS PRN
Qty: 30 TABLET | Refills: 0 | Status: SHIPPED | OUTPATIENT
Start: 2023-03-15 | End: 2023-04-14

## 2023-03-15 NOTE — TELEPHONE ENCOUNTER
Kj Blair called to request a refill on his medication. Last office visit : Visit date not found   Next office visit : 6/9/2023     Last UDS:   Amphetamine Screen, Urine   Date Value Ref Range Status   12/09/2022 neg  Final     Barbiturate Screen, Urine   Date Value Ref Range Status   12/09/2022 neg  Final     Benzodiazepine Screen, Urine   Date Value Ref Range Status   12/09/2022 pos  Final     Buprenorphine Urine   Date Value Ref Range Status   12/09/2022 neg  Final     Cocaine Metabolite Screen, Urine   Date Value Ref Range Status   12/09/2022 neg  Final     Gabapentin Screen, Urine   Date Value Ref Range Status   12/09/2022 neg  Final     MDMA, Urine   Date Value Ref Range Status   12/09/2022 neg  Final     Methamphetamine, Urine   Date Value Ref Range Status   12/09/2022 neg  Final     Opiate Scrn, Ur   Date Value Ref Range Status   12/09/2022 pos  Final     Oxycodone Screen, Ur   Date Value Ref Range Status   12/09/2022 neg  Final     PCP Screen, Urine   Date Value Ref Range Status   12/09/2022 neg  Final     Propoxyphene Screen, Urine   Date Value Ref Range Status   12/09/2022 neg  Final     THC Screen, Urine   Date Value Ref Range Status   12/09/2022 neg  Final     Tricyclic Antidepressants, Urine   Date Value Ref Range Status   12/09/2022 neg  Final       Last Chatham Jeanette: 3/15/23  Medication Contract:8/9/22    Last Fill: 2/16/22    Requested Prescriptions     Pending Prescriptions Disp Refills    HYDROcodone-acetaminophen (NORCO) 7.5-325 MG per tablet 30 tablet 0     Sig: Take 1 tablet by mouth every 6 hours as needed for Pain for up to 30 days. Please approve or refuse this medication.    Meme Christian LPN

## 2023-04-28 DIAGNOSIS — M48.02 CERVICAL SPINAL STENOSIS: ICD-10-CM

## 2023-04-28 RX ORDER — HYDROCODONE BITARTRATE AND ACETAMINOPHEN 7.5; 325 MG/1; MG/1
1 TABLET ORAL EVERY 6 HOURS PRN
Qty: 30 TABLET | Refills: 0 | OUTPATIENT
Start: 2023-04-28 | End: 2023-05-28

## 2023-05-15 ENCOUNTER — PATIENT MESSAGE (OUTPATIENT)
Dept: PRIMARY CARE CLINIC | Age: 58
End: 2023-05-15

## 2023-05-15 DIAGNOSIS — M48.02 CERVICAL SPINAL STENOSIS: ICD-10-CM

## 2023-05-15 RX ORDER — HYDROCODONE BITARTRATE AND ACETAMINOPHEN 7.5; 325 MG/1; MG/1
1 TABLET ORAL EVERY 6 HOURS PRN
Qty: 30 TABLET | Refills: 0 | Status: SHIPPED | OUTPATIENT
Start: 2023-05-15 | End: 2023-06-14

## 2023-05-15 NOTE — TELEPHONE ENCOUNTER
From: Reagan Kennedy  To: Dr. Cordelia Patle: 5/15/2023 5:13 PM CDT  Subject: Pain medication     Can I get a refill on my pain medication. Thank you.

## 2023-05-15 NOTE — TELEPHONE ENCOUNTER
Denice Boon called to request a refill on his medication. Last office visit : 12/9/22  Next office visit : 6/9/2023     Last UDS:   Amphetamine Screen, Urine   Date Value Ref Range Status   12/09/2022 neg  Final     Barbiturate Screen, Urine   Date Value Ref Range Status   12/09/2022 neg  Final     Benzodiazepine Screen, Urine   Date Value Ref Range Status   12/09/2022 pos  Final     Buprenorphine Urine   Date Value Ref Range Status   12/09/2022 neg  Final     Cocaine Metabolite Screen, Urine   Date Value Ref Range Status   12/09/2022 neg  Final     Gabapentin Screen, Urine   Date Value Ref Range Status   12/09/2022 neg  Final     MDMA, Urine   Date Value Ref Range Status   12/09/2022 neg  Final     Methamphetamine, Urine   Date Value Ref Range Status   12/09/2022 neg  Final     Opiate Scrn, Ur   Date Value Ref Range Status   12/09/2022 pos  Final     Oxycodone Screen, Ur   Date Value Ref Range Status   12/09/2022 neg  Final     PCP Screen, Urine   Date Value Ref Range Status   12/09/2022 neg  Final     Propoxyphene Screen, Urine   Date Value Ref Range Status   12/09/2022 neg  Final     THC Screen, Urine   Date Value Ref Range Status   12/09/2022 neg  Final     Tricyclic Antidepressants, Urine   Date Value Ref Range Status   12/09/2022 neg  Final       Last Tarkristen Martyr: 3/15/23  Medication Contract: 12/22   Last Fill: 4/13/23    Requested Prescriptions     Pending Prescriptions Disp Refills    HYDROcodone-acetaminophen (NORCO) 7.5-325 MG per tablet 30 tablet 0     Sig: Take 1 tablet by mouth every 6 hours as needed for Pain for up to 30 days. Please approve or refuse this medication.    Olimpia Wilson MA

## 2023-05-30 DIAGNOSIS — F51.04 CHRONIC INSOMNIA: ICD-10-CM

## 2023-05-30 RX ORDER — TEMAZEPAM 30 MG/1
30 CAPSULE ORAL NIGHTLY PRN
Qty: 30 CAPSULE | Refills: 0 | Status: SHIPPED | OUTPATIENT
Start: 2023-05-30 | End: 2023-06-29 | Stop reason: SDUPTHER

## 2023-06-06 SDOH — ECONOMIC STABILITY: HOUSING INSECURITY
IN THE LAST 12 MONTHS, WAS THERE A TIME WHEN YOU DID NOT HAVE A STEADY PLACE TO SLEEP OR SLEPT IN A SHELTER (INCLUDING NOW)?: NO

## 2023-06-06 SDOH — ECONOMIC STABILITY: FOOD INSECURITY: WITHIN THE PAST 12 MONTHS, YOU WORRIED THAT YOUR FOOD WOULD RUN OUT BEFORE YOU GOT MONEY TO BUY MORE.: NEVER TRUE

## 2023-06-06 SDOH — ECONOMIC STABILITY: TRANSPORTATION INSECURITY
IN THE PAST 12 MONTHS, HAS LACK OF TRANSPORTATION KEPT YOU FROM MEETINGS, WORK, OR FROM GETTING THINGS NEEDED FOR DAILY LIVING?: NO

## 2023-06-06 SDOH — ECONOMIC STABILITY: INCOME INSECURITY: HOW HARD IS IT FOR YOU TO PAY FOR THE VERY BASICS LIKE FOOD, HOUSING, MEDICAL CARE, AND HEATING?: NOT HARD AT ALL

## 2023-06-06 SDOH — ECONOMIC STABILITY: FOOD INSECURITY: WITHIN THE PAST 12 MONTHS, THE FOOD YOU BOUGHT JUST DIDN'T LAST AND YOU DIDN'T HAVE MONEY TO GET MORE.: NEVER TRUE

## 2023-06-07 DIAGNOSIS — E78.01 FAMILIAL HYPERCHOLESTEREMIA: ICD-10-CM

## 2023-06-07 LAB
CHOLEST SERPL-MCNC: 149 MG/DL (ref 160–199)
HDLC SERPL-MCNC: 45 MG/DL (ref 55–121)
LDLC SERPL CALC-MCNC: 76 MG/DL
TRIGL SERPL-MCNC: 141 MG/DL (ref 0–149)

## 2023-06-09 ENCOUNTER — OFFICE VISIT (OUTPATIENT)
Dept: PRIMARY CARE CLINIC | Age: 58
End: 2023-06-09
Payer: COMMERCIAL

## 2023-06-09 VITALS
OXYGEN SATURATION: 98 % | BODY MASS INDEX: 34.3 KG/M2 | DIASTOLIC BLOOD PRESSURE: 82 MMHG | WEIGHT: 219 LBS | HEART RATE: 81 BPM | SYSTOLIC BLOOD PRESSURE: 130 MMHG | TEMPERATURE: 98 F

## 2023-06-09 DIAGNOSIS — M72.2 PLANTAR FASCIITIS OF LEFT FOOT: ICD-10-CM

## 2023-06-09 DIAGNOSIS — M25.512 CHRONIC LEFT SHOULDER PAIN: ICD-10-CM

## 2023-06-09 DIAGNOSIS — E78.01 FAMILIAL HYPERCHOLESTEREMIA: ICD-10-CM

## 2023-06-09 DIAGNOSIS — M48.02 CERVICAL SPINAL STENOSIS: ICD-10-CM

## 2023-06-09 DIAGNOSIS — I10 ESSENTIAL (PRIMARY) HYPERTENSION: Primary | ICD-10-CM

## 2023-06-09 DIAGNOSIS — E78.00 HYPERCHOLESTEROLEMIA: ICD-10-CM

## 2023-06-09 DIAGNOSIS — F51.01 PRIMARY INSOMNIA: ICD-10-CM

## 2023-06-09 DIAGNOSIS — G89.29 CHRONIC LEFT SHOULDER PAIN: ICD-10-CM

## 2023-06-09 PROCEDURE — 3079F DIAST BP 80-89 MM HG: CPT | Performed by: FAMILY MEDICINE

## 2023-06-09 PROCEDURE — 99214 OFFICE O/P EST MOD 30 MIN: CPT | Performed by: FAMILY MEDICINE

## 2023-06-09 PROCEDURE — 3075F SYST BP GE 130 - 139MM HG: CPT | Performed by: FAMILY MEDICINE

## 2023-06-09 SDOH — ECONOMIC STABILITY: FOOD INSECURITY: WITHIN THE PAST 12 MONTHS, THE FOOD YOU BOUGHT JUST DIDN'T LAST AND YOU DIDN'T HAVE MONEY TO GET MORE.: NEVER TRUE

## 2023-06-09 SDOH — ECONOMIC STABILITY: INCOME INSECURITY: HOW HARD IS IT FOR YOU TO PAY FOR THE VERY BASICS LIKE FOOD, HOUSING, MEDICAL CARE, AND HEATING?: NOT HARD AT ALL

## 2023-06-09 SDOH — ECONOMIC STABILITY: FOOD INSECURITY: WITHIN THE PAST 12 MONTHS, YOU WORRIED THAT YOUR FOOD WOULD RUN OUT BEFORE YOU GOT MONEY TO BUY MORE.: NEVER TRUE

## 2023-06-09 ASSESSMENT — PATIENT HEALTH QUESTIONNAIRE - PHQ9
SUM OF ALL RESPONSES TO PHQ QUESTIONS 1-9: 0
2. FEELING DOWN, DEPRESSED OR HOPELESS: 0
SUM OF ALL RESPONSES TO PHQ9 QUESTIONS 1 & 2: 0
1. LITTLE INTEREST OR PLEASURE IN DOING THINGS: 0

## 2023-06-09 NOTE — PROGRESS NOTES
76 06/07/2023    LDLCALC 117 12/01/2022    LDLCALC 81 10/28/2021     No results found for: LABVLDL, VLDL  No results found for: CHOLHDLRATIO              Assessment:     1. Essential (primary) hypertension    2. Familial hypercholesteremia    3. Primary insomnia    4. Cervical spinal stenosis    5. Hypercholesterolemia    6. Plantar fasciitis of left foot    7. Chronic left shoulder pain      Plan:   1. controlled  2. controlled  3. Restoril helps. 4. stable  5. Hyperlipidemia: LDL has improved considerably. Continue with rosuvastatin 5 mg  6. Discussed referral to podiatrist.  He states he will consider  7. States he is considering seeing orthopedist.    Welibertad Brill with meloxicam 15 mg daily for both his plantar fasciitis and chronic left shoulder pain. Hydrocodone 7.5 mg every 6 hours as needed number 30 tablets/month          No orders of the defined types were placed in this encounter. Current Outpatient Medications   Medication Sig Dispense Refill    temazepam (RESTORIL) 30 MG capsule Take 1 capsule by mouth nightly as needed for Sleep for up to 30 days. 30 capsule 0    HYDROcodone-acetaminophen (NORCO) 7.5-325 MG per tablet Take 1 tablet by mouth every 6 hours as needed for Pain for up to 30 days. 30 tablet 0    meloxicam (MOBIC) 15 MG tablet Take 1 tablet by mouth in the morning. 90 tablet 1    lisinopril (PRINIVIL;ZESTRIL) 10 MG tablet Take 1 tablet by mouth daily 90 tablet 3    rosuvastatin (CRESTOR) 5 MG tablet TAKE 1 TABLET BY MOUTH ONCE DAILY 90 tablet 3     No current facility-administered medications for this visit. Return in about 3 months (around 9/9/2023). Discussed use, benefit, and side effects of prescribed medications.          History, Medications, Allergies     No Known Allergies  _______________________________________________________________  Past Medical History:   Diagnosis Date    Hypertension      Past Surgical History:   Procedure Laterality Date    COLONOSCOPY N/A

## 2023-06-29 DIAGNOSIS — F51.04 CHRONIC INSOMNIA: ICD-10-CM

## 2023-06-30 RX ORDER — TEMAZEPAM 30 MG/1
30 CAPSULE ORAL NIGHTLY PRN
Qty: 30 CAPSULE | Refills: 0 | Status: SHIPPED | OUTPATIENT
Start: 2023-06-30 | End: 2023-07-30

## 2023-07-11 DIAGNOSIS — M48.02 CERVICAL SPINAL STENOSIS: ICD-10-CM

## 2023-07-13 RX ORDER — HYDROCODONE BITARTRATE AND ACETAMINOPHEN 7.5; 325 MG/1; MG/1
1 TABLET ORAL EVERY 6 HOURS PRN
Qty: 30 TABLET | Refills: 0 | Status: SHIPPED | OUTPATIENT
Start: 2023-07-13 | End: 2023-08-12

## 2023-07-19 DIAGNOSIS — I10 ESSENTIAL (PRIMARY) HYPERTENSION: ICD-10-CM

## 2023-07-20 RX ORDER — LISINOPRIL 10 MG/1
10 TABLET ORAL DAILY
Qty: 90 TABLET | Refills: 3 | OUTPATIENT
Start: 2023-07-20

## 2023-07-20 RX ORDER — ROSUVASTATIN CALCIUM 5 MG/1
TABLET, COATED ORAL
Qty: 90 TABLET | Refills: 3 | OUTPATIENT
Start: 2023-07-20

## 2023-07-20 RX ORDER — LISINOPRIL 10 MG/1
10 TABLET ORAL DAILY
Qty: 90 TABLET | Refills: 3 | Status: SHIPPED | OUTPATIENT
Start: 2023-07-20

## 2023-07-20 RX ORDER — ROSUVASTATIN CALCIUM 5 MG/1
TABLET, COATED ORAL
Qty: 90 TABLET | Refills: 3 | Status: SHIPPED | OUTPATIENT
Start: 2023-07-20

## 2023-08-14 ENCOUNTER — PATIENT MESSAGE (OUTPATIENT)
Dept: PRIMARY CARE CLINIC | Age: 58
End: 2023-08-14

## 2023-08-14 DIAGNOSIS — M48.02 CERVICAL SPINAL STENOSIS: ICD-10-CM

## 2023-08-15 RX ORDER — HYDROCODONE BITARTRATE AND ACETAMINOPHEN 7.5; 325 MG/1; MG/1
1 TABLET ORAL EVERY 6 HOURS PRN
Qty: 30 TABLET | Refills: 0 | Status: SHIPPED | OUTPATIENT
Start: 2023-08-15 | End: 2023-09-14

## 2023-08-15 NOTE — TELEPHONE ENCOUNTER
From: Allison Kennedy  To: Dr. Ken Person: 8/14/2023 6:07 PM CDT  Subject: Pain meds    Can I get a refill on the pain meds. Thank you.

## 2023-08-15 NOTE — TELEPHONE ENCOUNTER
Ralf Tavarez called to request a refill on his medication. Last office visit : 6/9/2023   Next office visit : 9/12/2023     Last UDS:   Amphetamine Screen, Urine   Date Value Ref Range Status   12/09/2022 neg  Final     Barbiturate Screen, Urine   Date Value Ref Range Status   12/09/2022 neg  Final     Benzodiazepine Screen, Urine   Date Value Ref Range Status   12/09/2022 pos  Final     Buprenorphine Urine   Date Value Ref Range Status   12/09/2022 neg  Final     Cocaine Metabolite Screen, Urine   Date Value Ref Range Status   12/09/2022 neg  Final     Gabapentin Screen, Urine   Date Value Ref Range Status   12/09/2022 neg  Final     MDMA, Urine   Date Value Ref Range Status   12/09/2022 neg  Final     Methamphetamine, Urine   Date Value Ref Range Status   12/09/2022 neg  Final     Opiate Scrn, Ur   Date Value Ref Range Status   12/09/2022 pos  Final     Oxycodone Screen, Ur   Date Value Ref Range Status   12/09/2022 neg  Final     PCP Screen, Urine   Date Value Ref Range Status   12/09/2022 neg  Final     Propoxyphene Screen, Urine   Date Value Ref Range Status   12/09/2022 neg  Final     THC Screen, Urine   Date Value Ref Range Status   12/09/2022 neg  Final     Tricyclic Antidepressants, Urine   Date Value Ref Range Status   12/09/2022 neg  Final       Last Beauty Hammed: today  Medication Contract: 08/09/22   Last Fill: 07/13/23    Requested Prescriptions     Pending Prescriptions Disp Refills    HYDROcodone-acetaminophen (NORCO) 7.5-325 MG per tablet 30 tablet 0     Sig: Take 1 tablet by mouth every 6 hours as needed for Pain for up to 30 days. Please approve or refuse this medication.    Radha Birmingham LPN

## 2023-08-16 DIAGNOSIS — M48.02 CERVICAL SPINAL STENOSIS: ICD-10-CM

## 2023-08-16 RX ORDER — HYDROCODONE BITARTRATE AND ACETAMINOPHEN 7.5; 325 MG/1; MG/1
1 TABLET ORAL EVERY 6 HOURS PRN
Qty: 30 TABLET | Refills: 0 | OUTPATIENT
Start: 2023-08-16 | End: 2023-09-15

## 2023-09-12 ENCOUNTER — OFFICE VISIT (OUTPATIENT)
Dept: PRIMARY CARE CLINIC | Age: 58
End: 2023-09-12
Payer: COMMERCIAL

## 2023-09-12 VITALS
SYSTOLIC BLOOD PRESSURE: 132 MMHG | OXYGEN SATURATION: 98 % | HEART RATE: 78 BPM | DIASTOLIC BLOOD PRESSURE: 84 MMHG | BODY MASS INDEX: 34.3 KG/M2 | WEIGHT: 219 LBS | TEMPERATURE: 97.8 F

## 2023-09-12 DIAGNOSIS — M48.02 CERVICAL SPINAL STENOSIS: ICD-10-CM

## 2023-09-12 DIAGNOSIS — I10 ESSENTIAL (PRIMARY) HYPERTENSION: ICD-10-CM

## 2023-09-12 DIAGNOSIS — M67.441 MUCOUS CYST OF DIGIT OF RIGHT HAND: ICD-10-CM

## 2023-09-12 DIAGNOSIS — E78.00 HYPERCHOLESTEROLEMIA: ICD-10-CM

## 2023-09-12 DIAGNOSIS — M25.512 CHRONIC LEFT SHOULDER PAIN: ICD-10-CM

## 2023-09-12 DIAGNOSIS — Z00.00 ANNUAL PHYSICAL EXAM: Primary | ICD-10-CM

## 2023-09-12 DIAGNOSIS — G89.29 CHRONIC LEFT SHOULDER PAIN: ICD-10-CM

## 2023-09-12 DIAGNOSIS — F51.04 CHRONIC INSOMNIA: ICD-10-CM

## 2023-09-12 DIAGNOSIS — Z12.5 ENCOUNTER FOR PROSTATE CANCER SCREENING: ICD-10-CM

## 2023-09-12 PROCEDURE — 3075F SYST BP GE 130 - 139MM HG: CPT | Performed by: FAMILY MEDICINE

## 2023-09-12 PROCEDURE — 3079F DIAST BP 80-89 MM HG: CPT | Performed by: FAMILY MEDICINE

## 2023-09-12 PROCEDURE — 99396 PREV VISIT EST AGE 40-64: CPT | Performed by: FAMILY MEDICINE

## 2023-09-12 RX ORDER — HYDROCODONE BITARTRATE AND ACETAMINOPHEN 7.5; 325 MG/1; MG/1
1 TABLET ORAL EVERY 6 HOURS PRN
Qty: 30 TABLET | Refills: 0 | Status: SHIPPED | OUTPATIENT
Start: 2023-09-12 | End: 2023-10-12

## 2023-09-12 RX ORDER — MELOXICAM 15 MG/1
15 TABLET ORAL DAILY
Qty: 90 TABLET | Refills: 1 | Status: SHIPPED | OUTPATIENT
Start: 2023-09-12

## 2023-09-12 RX ORDER — TEMAZEPAM 30 MG/1
30 CAPSULE ORAL NIGHTLY PRN
Qty: 30 CAPSULE | Refills: 0 | Status: SHIPPED | OUTPATIENT
Start: 2023-09-12 | End: 2023-10-12

## 2023-09-12 NOTE — PROGRESS NOTES
06/09/23 219 lb (99.3 kg)   12/09/22 216 lb 12.8 oz (98.3 kg)           Physical Exam  Physical Exam   Constitutional: He appears well. Does not appear ill. Neck: Neck supple. No masses. Neck Symmetric. Normal tracheal position. No thyroid enlargement  Cardiovascular: Normal rate and regular rhythm. Exam reveals no friction rub. No murmur heard. Respiratory:  Effort normal and breath sounds normal. No respiratory distress. No wheezes. No rales. No use of accessory muscles or intercostal retractions. Neurological: alert. Psychiatric: normal mood and affect. His behavior is normal.     Lab Results   Component Value Date    WBC 8.2 12/01/2022    HGB 16.2 12/01/2022    HCT 47.8 12/01/2022    MCV 95.4 (H) 12/01/2022     12/01/2022     Lab Results   Component Value Date    LABA1C 5.4 12/01/2022    LABA1C 5.4 04/28/2022     Lab Results   Component Value Date    LDLCALC 76 06/07/2023    CREATININE 0.9 12/01/2022      Lab Results   Component Value Date     12/01/2022    K 4.5 12/01/2022     12/01/2022    CO2 28 12/01/2022    BUN 13 12/01/2022    CREATININE 0.9 12/01/2022    GLUCOSE 109 12/01/2022    CALCIUM 9.7 12/01/2022    PROT 7.4 12/01/2022    LABALBU 4.8 12/01/2022    BILITOT 0.5 12/01/2022    ALKPHOS 62 12/01/2022    AST 35 12/01/2022    ALT 42 (H) 12/01/2022    LABGLOM >60 12/01/2022    GFRAA >59 04/28/2022        Lab Results   Component Value Date    CHOL 149 (L) 06/07/2023    CHOL 212 (H) 12/01/2022    CHOL 140 (L) 10/28/2021     Lab Results   Component Value Date    TRIG 141 06/07/2023    TRIG 182 (H) 12/01/2022    TRIG 79 10/28/2021     Lab Results   Component Value Date    HDL 45 (L) 06/07/2023    HDL 59 12/01/2022    HDL 43 (L) 10/28/2021     Lab Results   Component Value Date    LDLCALC 76 06/07/2023    LDLCALC 117 12/01/2022    LDLCALC 81 10/28/2021     No results found for: \"LABVLDL\", \"VLDL\"  No results found for: \"CHOLHDLRATIO\"            Assessment Plan:   1.  Annual physical

## 2023-09-12 NOTE — PATIENT INSTRUCTIONS
Examine your lifestyle and the barriers to bad and good habits and how you can design your life to make better choices      Make it EASY to do the RIGHT THINGS. 1)  You can choose to Get 150 min/week of moderate exercise (can talk but can't sing) or 75 min/week of vigorous exercise (can't talk)   Examples: Walking, treadmill, bicycling, attending a gym. 2)  You can choose to Get 7-9 hours of sleep per night    Allow enough time each night to get adequate sleep. Keep regular evening hours, same to bed and getting up every day. 3)  You can choose to Strength Train 2 x a week on non-consecutive days   Bodyweight exercises such push ups, sit ups, pilates or yoga   Purchase resistance bands to perform exercises   Utilize phone apps such as: [address]  or [address] your local gym for a     4)  You can choose good nutrition. Only eat your goal weight (in lbs) x 10 calories/day and get 5 servings of Vegetables/day   Choose to eat a healthy diet such as the Mediterranean diet. Plant based diets reduce risk of heart attack/stroke and will help you feel full on less food. Avoid highly processed foods and processed carbohydrates. Utilize apps to track your food:  Examples: Blanch Scales, or CalorieCounter by [employer] to follow a program.   Examples: Weight Watchers, Gustavo Gutierrez or [employer] apps identify and improve eating habits:   Examples: Eat Right Now, Noom    5)  You can choose to drink less alcohol: Drink less than 1-2 drinks/day no more than 7 drinks per week. Alcohol will disrupt your sleep and add calories to your day    6)  You can choose to Practice Mindfulness and work on stress reduction.     Utilize apps such as Calm, Headspace, Abide  Contact local behavioral health specialists      Small changes every day will add up

## 2023-10-11 DIAGNOSIS — M48.02 CERVICAL SPINAL STENOSIS: ICD-10-CM

## 2023-10-13 RX ORDER — HYDROCODONE BITARTRATE AND ACETAMINOPHEN 7.5; 325 MG/1; MG/1
1 TABLET ORAL EVERY 6 HOURS PRN
Qty: 30 TABLET | Refills: 0 | Status: SHIPPED | OUTPATIENT
Start: 2023-10-13 | End: 2023-11-12

## 2023-10-13 NOTE — TELEPHONE ENCOUNTER
Elyssa Meraz called to request a refill on his medication. Last office visit : 9/12/2023   Next office visit : 12/12/2023     Last UDS:   Amphetamine Screen, Urine   Date Value Ref Range Status   12/09/2022 neg  Final     Barbiturate Screen, Urine   Date Value Ref Range Status   12/09/2022 neg  Final     Benzodiazepine Screen, Urine   Date Value Ref Range Status   12/09/2022 pos  Final     Buprenorphine Urine   Date Value Ref Range Status   12/09/2022 neg  Final     Cocaine Metabolite Screen, Urine   Date Value Ref Range Status   12/09/2022 neg  Final     Gabapentin Screen, Urine   Date Value Ref Range Status   12/09/2022 neg  Final     MDMA, Urine   Date Value Ref Range Status   12/09/2022 neg  Final     Methamphetamine, Urine   Date Value Ref Range Status   12/09/2022 neg  Final     Opiate Scrn, Ur   Date Value Ref Range Status   12/09/2022 pos  Final     Oxycodone Screen, Ur   Date Value Ref Range Status   12/09/2022 neg  Final     PCP Screen, Urine   Date Value Ref Range Status   12/09/2022 neg  Final     Propoxyphene Screen, Urine   Date Value Ref Range Status   12/09/2022 neg  Final     THC Screen, Urine   Date Value Ref Range Status   12/09/2022 neg  Final     Tricyclic Antidepressants, Urine   Date Value Ref Range Status   12/09/2022 neg  Final       Last Lattie Barbone: 8/17/2023  Medication Contract: 8/9/2022   UDS 12/09/2022  Requested Prescriptions     Pending Prescriptions Disp Refills    HYDROcodone-acetaminophen (NORCO) 7.5-325 MG per tablet 30 tablet 0     Sig: Take 1 tablet by mouth every 6 hours as needed for Pain for up to 30 days. Please approve or refuse this medication.    Landon Mac

## 2023-11-08 DIAGNOSIS — M48.02 CERVICAL SPINAL STENOSIS: ICD-10-CM

## 2023-11-09 RX ORDER — HYDROCODONE BITARTRATE AND ACETAMINOPHEN 7.5; 325 MG/1; MG/1
1 TABLET ORAL EVERY 6 HOURS PRN
Qty: 30 TABLET | Refills: 0 | Status: SHIPPED | OUTPATIENT
Start: 2023-11-09 | End: 2023-12-09

## 2023-11-09 NOTE — TELEPHONE ENCOUNTER
Nicolle Mcmillan called to request a refill on his medication. Last office visit : 9/12/2023   Next office visit : 12/12/2023     Last UDS:   Amphetamine Screen, Urine   Date Value Ref Range Status   12/09/2022 neg  Final     Barbiturate Screen, Urine   Date Value Ref Range Status   12/09/2022 neg  Final     Benzodiazepine Screen, Urine   Date Value Ref Range Status   12/09/2022 pos  Final     Buprenorphine Urine   Date Value Ref Range Status   12/09/2022 neg  Final     Cocaine Metabolite Screen, Urine   Date Value Ref Range Status   12/09/2022 neg  Final     Gabapentin Screen, Urine   Date Value Ref Range Status   12/09/2022 neg  Final     MDMA, Urine   Date Value Ref Range Status   12/09/2022 neg  Final     Methamphetamine, Urine   Date Value Ref Range Status   12/09/2022 neg  Final     Opiate Scrn, Ur   Date Value Ref Range Status   12/09/2022 pos  Final     Oxycodone Screen, Ur   Date Value Ref Range Status   12/09/2022 neg  Final     PCP Screen, Urine   Date Value Ref Range Status   12/09/2022 neg  Final     Propoxyphene Screen, Urine   Date Value Ref Range Status   12/09/2022 neg  Final     THC Screen, Urine   Date Value Ref Range Status   12/09/2022 neg  Final     Tricyclic Antidepressants, Urine   Date Value Ref Range Status   12/09/2022 neg  Final       Last Perlita Patsy: 08/17/23  Medication Contract: 08/08/22   Last Fill: 10/13/23    Requested Prescriptions     Pending Prescriptions Disp Refills    HYDROcodone-acetaminophen (NORCO) 7.5-325 MG per tablet 30 tablet 0     Sig: Take 1 tablet by mouth every 6 hours as needed for Pain for up to 30 days. Please approve or refuse this medication.    Abida Santoro LPN

## 2023-12-08 DIAGNOSIS — M48.02 CERVICAL SPINAL STENOSIS: ICD-10-CM

## 2023-12-08 RX ORDER — HYDROCODONE BITARTRATE AND ACETAMINOPHEN 7.5; 325 MG/1; MG/1
1 TABLET ORAL EVERY 6 HOURS PRN
Qty: 30 TABLET | Refills: 0 | Status: SHIPPED | OUTPATIENT
Start: 2023-12-08 | End: 2024-01-07

## 2023-12-08 NOTE — TELEPHONE ENCOUNTER
Sue Antony called to request a refill on his medication. Last office visit : 9/12/2023   Next office visit : 12/12/2023     Last UDS:   Amphetamine Screen, Urine   Date Value Ref Range Status   12/09/2022 neg  Final     Barbiturate Screen, Urine   Date Value Ref Range Status   12/09/2022 neg  Final     Benzodiazepine Screen, Urine   Date Value Ref Range Status   12/09/2022 pos  Final     Buprenorphine Urine   Date Value Ref Range Status   12/09/2022 neg  Final     Cocaine Metabolite Screen, Urine   Date Value Ref Range Status   12/09/2022 neg  Final     Gabapentin Screen, Urine   Date Value Ref Range Status   12/09/2022 neg  Final     MDMA, Urine   Date Value Ref Range Status   12/09/2022 neg  Final     Methamphetamine, Urine   Date Value Ref Range Status   12/09/2022 neg  Final     Opiate Scrn, Ur   Date Value Ref Range Status   12/09/2022 pos  Final     Oxycodone Screen, Ur   Date Value Ref Range Status   12/09/2022 neg  Final     PCP Screen, Urine   Date Value Ref Range Status   12/09/2022 neg  Final     Propoxyphene Screen, Urine   Date Value Ref Range Status   12/09/2022 neg  Final     THC Screen, Urine   Date Value Ref Range Status   12/09/2022 neg  Final     Tricyclic Antidepressants, Urine   Date Value Ref Range Status   12/09/2022 neg  Final       Last Rae Lob: 12/8/23  Medication Contract: 12/9/23   Last Fill: 11/9/23  Contacting pt to update UDS  Requested Prescriptions     Pending Prescriptions Disp Refills    HYDROcodone-acetaminophen (NORCO) 7.5-325 MG per tablet 30 tablet 0     Sig: Take 1 tablet by mouth every 6 hours as needed for Pain for up to 30 days. Please approve or refuse this medication.    Gabriel Goetz MA

## 2023-12-12 ENCOUNTER — OFFICE VISIT (OUTPATIENT)
Dept: PRIMARY CARE CLINIC | Age: 58
End: 2023-12-12
Payer: COMMERCIAL

## 2023-12-12 VITALS
HEART RATE: 121 BPM | DIASTOLIC BLOOD PRESSURE: 74 MMHG | WEIGHT: 210 LBS | SYSTOLIC BLOOD PRESSURE: 122 MMHG | TEMPERATURE: 97.8 F | HEIGHT: 66 IN | BODY MASS INDEX: 33.75 KG/M2 | OXYGEN SATURATION: 91 %

## 2023-12-12 DIAGNOSIS — I10 ESSENTIAL (PRIMARY) HYPERTENSION: Primary | ICD-10-CM

## 2023-12-12 DIAGNOSIS — E78.00 HYPERCHOLESTEROLEMIA: ICD-10-CM

## 2023-12-12 DIAGNOSIS — Z51.81 MEDICATION MONITORING ENCOUNTER: ICD-10-CM

## 2023-12-12 DIAGNOSIS — M25.512 CHRONIC LEFT SHOULDER PAIN: ICD-10-CM

## 2023-12-12 DIAGNOSIS — M48.02 CERVICAL SPINAL STENOSIS: ICD-10-CM

## 2023-12-12 DIAGNOSIS — G89.29 CHRONIC LEFT SHOULDER PAIN: ICD-10-CM

## 2023-12-12 PROCEDURE — 3078F DIAST BP <80 MM HG: CPT | Performed by: FAMILY MEDICINE

## 2023-12-12 PROCEDURE — 3074F SYST BP LT 130 MM HG: CPT | Performed by: FAMILY MEDICINE

## 2023-12-12 PROCEDURE — 99214 OFFICE O/P EST MOD 30 MIN: CPT | Performed by: FAMILY MEDICINE

## 2023-12-12 NOTE — PATIENT INSTRUCTIONS
Labs before next appointment:  Go to room 405 for labs prior to next visit. Be sure to fast for at least 10 hours prior to laboratory appointment. Would recommend getting labs about 1 week prior to the appointment time to ensure they are available at the time of the appointment. No appointment is necessary for laboratory work as the orders have already been placed.     Lab opens at 6:30 am and closes at 4:30 pm.       Consider the following book:   Unwinding Anxiety: New Science Shows How to Break the Cycles of Worry and Fear to [address]  by Trinidad Wong

## 2023-12-12 NOTE — PROGRESS NOTES
[facility]  Advised the terms of our controlled substance agreement do not allow for illegal substances. Will need to have a clean urine drug screen before receiving another refill  5. As above  -       Doses of medications listed below     -Needs labs ASAP    If symptoms worsen or new symptoms develop,  return to clinic or go to ED. Orders Placed This Encounter    POCT Rapid Drug Screen        Current Outpatient Medications   Medication Sig Dispense Refill    HYDROcodone-acetaminophen (NORCO) 7.5-325 MG per tablet Take 1 tablet by mouth every 6 hours as needed for Pain for up to 30 days. 30 tablet 0    meloxicam (MOBIC) 15 MG tablet Take 1 tablet by mouth daily 90 tablet 1    lisinopril (PRINIVIL;ZESTRIL) 10 MG tablet Take 1 tablet by mouth daily 90 tablet 3    rosuvastatin (CRESTOR) 5 MG tablet TAKE ONE TABLET BY MOUTH EVERY DAILY 90 tablet 3     No current facility-administered medications for this visit. No follow-ups on file. Discussed use, benefit, and side effects of prescribed medications. History, Medications, Allergies     No Known Allergies  _______________________________________________________________  Past Medical History:   Diagnosis Date    Hypertension      Past Surgical History:   Procedure Laterality Date    COLONOSCOPY N/A 6/19/2019    Dr Francesco Seymour, 10 yr recall    FOOT NEUROMA SURGERY Left 2013      Family History   Problem Relation Age of Onset    No Known Problems Mother     Heart Disease Father     High Cholesterol Brother     Social History     Tobacco Use    Smoking status: Never    Smokeless tobacco: Never   Substance Use Topics    Alcohol use: Yes     Comment: social        _______________________________________________________________    Note dictated using Dragon Dictation software  Sometimes this dictation software makes erroneous transcriptions.

## 2024-03-12 ENCOUNTER — OFFICE VISIT (OUTPATIENT)
Dept: PRIMARY CARE CLINIC | Age: 59
End: 2024-03-12
Payer: COMMERCIAL

## 2024-03-12 VITALS
DIASTOLIC BLOOD PRESSURE: 76 MMHG | BODY MASS INDEX: 32.47 KG/M2 | OXYGEN SATURATION: 96 % | SYSTOLIC BLOOD PRESSURE: 122 MMHG | WEIGHT: 202 LBS | HEIGHT: 66 IN | TEMPERATURE: 97 F | HEART RATE: 90 BPM

## 2024-03-12 DIAGNOSIS — E78.00 HYPERCHOLESTEROLEMIA: ICD-10-CM

## 2024-03-12 DIAGNOSIS — M48.02 CERVICAL SPINAL STENOSIS: ICD-10-CM

## 2024-03-12 DIAGNOSIS — I10 ESSENTIAL (PRIMARY) HYPERTENSION: ICD-10-CM

## 2024-03-12 DIAGNOSIS — G89.29 CHRONIC RIGHT SHOULDER PAIN: Primary | ICD-10-CM

## 2024-03-12 DIAGNOSIS — M25.511 CHRONIC RIGHT SHOULDER PAIN: Primary | ICD-10-CM

## 2024-03-12 PROCEDURE — 3074F SYST BP LT 130 MM HG: CPT | Performed by: FAMILY MEDICINE

## 2024-03-12 PROCEDURE — 99214 OFFICE O/P EST MOD 30 MIN: CPT | Performed by: FAMILY MEDICINE

## 2024-03-12 PROCEDURE — 3078F DIAST BP <80 MM HG: CPT | Performed by: FAMILY MEDICINE

## 2024-03-12 ASSESSMENT — PATIENT HEALTH QUESTIONNAIRE - PHQ9
SUM OF ALL RESPONSES TO PHQ QUESTIONS 1-9: 0
SUM OF ALL RESPONSES TO PHQ QUESTIONS 1-9: 0
2. FEELING DOWN, DEPRESSED OR HOPELESS: 0
SUM OF ALL RESPONSES TO PHQ QUESTIONS 1-9: 0
SUM OF ALL RESPONSES TO PHQ9 QUESTIONS 1 & 2: 0
SUM OF ALL RESPONSES TO PHQ QUESTIONS 1-9: 0
1. LITTLE INTEREST OR PLEASURE IN DOING THINGS: 0

## 2024-03-12 NOTE — PROGRESS NOTES
mouth daily 90 tablet 1    lisinopril (PRINIVIL;ZESTRIL) 10 MG tablet Take 1 tablet by mouth daily 90 tablet 3    rosuvastatin (CRESTOR) 5 MG tablet TAKE ONE TABLET BY MOUTH EVERY DAILY 90 tablet 3     No current facility-administered medications for this visit.       No follow-ups on file.     Discussed use, benefit, and side effects of prescribed medications.         History, Medications, Allergies     No Known Allergies  _______________________________________________________________  Past Medical History:   Diagnosis Date    Hypertension      Past Surgical History:   Procedure Laterality Date    COLONOSCOPY N/A 6/19/2019    Dr Juan Jose Caballero-Diverticulosis, 10 yr recall    FOOT NEUROMA SURGERY Left 2013      Family History   Problem Relation Age of Onset    No Known Problems Mother     Heart Disease Father     High Cholesterol Brother     Social History     Tobacco Use    Smoking status: Never    Smokeless tobacco: Never   Substance Use Topics    Alcohol use: Yes     Comment: social        _______________________________________________________________    Note dictated using Dragon Dictation software  Sometimes this dictation software makes erroneous transcriptions.

## 2024-04-25 DIAGNOSIS — I10 ESSENTIAL (PRIMARY) HYPERTENSION: ICD-10-CM

## 2024-04-25 RX ORDER — LISINOPRIL 10 MG/1
10 TABLET ORAL DAILY
Qty: 90 TABLET | Refills: 3 | OUTPATIENT
Start: 2024-04-25

## 2024-04-25 RX ORDER — ROSUVASTATIN CALCIUM 5 MG/1
TABLET, COATED ORAL
Qty: 90 TABLET | Refills: 3 | OUTPATIENT
Start: 2024-04-25

## 2024-07-26 DIAGNOSIS — I10 ESSENTIAL (PRIMARY) HYPERTENSION: ICD-10-CM

## 2024-07-26 RX ORDER — ROSUVASTATIN CALCIUM 5 MG/1
TABLET, COATED ORAL
Qty: 90 TABLET | Refills: 1 | Status: SHIPPED | OUTPATIENT
Start: 2024-07-26

## 2024-07-26 RX ORDER — LISINOPRIL 10 MG/1
10 TABLET ORAL DAILY
Qty: 90 TABLET | Refills: 1 | Status: SHIPPED | OUTPATIENT
Start: 2024-07-26

## 2024-10-15 SDOH — ECONOMIC STABILITY: INCOME INSECURITY: HOW HARD IS IT FOR YOU TO PAY FOR THE VERY BASICS LIKE FOOD, HOUSING, MEDICAL CARE, AND HEATING?: NOT HARD AT ALL

## 2024-10-15 SDOH — ECONOMIC STABILITY: FOOD INSECURITY: WITHIN THE PAST 12 MONTHS, YOU WORRIED THAT YOUR FOOD WOULD RUN OUT BEFORE YOU GOT MONEY TO BUY MORE.: NEVER TRUE

## 2024-10-15 SDOH — ECONOMIC STABILITY: FOOD INSECURITY: WITHIN THE PAST 12 MONTHS, THE FOOD YOU BOUGHT JUST DIDN'T LAST AND YOU DIDN'T HAVE MONEY TO GET MORE.: NEVER TRUE

## 2024-10-18 ENCOUNTER — OFFICE VISIT (OUTPATIENT)
Dept: PRIMARY CARE CLINIC | Age: 59
End: 2024-10-18
Payer: COMMERCIAL

## 2024-10-18 VITALS
SYSTOLIC BLOOD PRESSURE: 114 MMHG | WEIGHT: 196 LBS | TEMPERATURE: 97.3 F | OXYGEN SATURATION: 97 % | HEART RATE: 90 BPM | DIASTOLIC BLOOD PRESSURE: 74 MMHG | BODY MASS INDEX: 31.64 KG/M2

## 2024-10-18 DIAGNOSIS — I10 ESSENTIAL (PRIMARY) HYPERTENSION: Primary | ICD-10-CM

## 2024-10-18 DIAGNOSIS — M25.511 CHRONIC RIGHT SHOULDER PAIN: ICD-10-CM

## 2024-10-18 DIAGNOSIS — Z12.5 ENCOUNTER FOR PROSTATE CANCER SCREENING: ICD-10-CM

## 2024-10-18 DIAGNOSIS — F51.04 CHRONIC INSOMNIA: ICD-10-CM

## 2024-10-18 DIAGNOSIS — I10 ESSENTIAL (PRIMARY) HYPERTENSION: ICD-10-CM

## 2024-10-18 DIAGNOSIS — E78.00 HYPERCHOLESTEROLEMIA: Primary | ICD-10-CM

## 2024-10-18 DIAGNOSIS — E78.00 HYPERCHOLESTEROLEMIA: ICD-10-CM

## 2024-10-18 DIAGNOSIS — G89.29 CHRONIC RIGHT SHOULDER PAIN: ICD-10-CM

## 2024-10-18 LAB
ALBUMIN SERPL-MCNC: 4.3 G/DL (ref 3.5–5.2)
ALP SERPL-CCNC: 63 U/L (ref 40–129)
ALT SERPL-CCNC: 25 U/L (ref 5–41)
ANION GAP SERPL CALCULATED.3IONS-SCNC: 11 MMOL/L (ref 7–19)
AST SERPL-CCNC: 32 U/L (ref 5–40)
BASOPHILS # BLD: 0 K/UL (ref 0–0.2)
BASOPHILS NFR BLD: 0.4 % (ref 0–1)
BILIRUB SERPL-MCNC: 0.5 MG/DL (ref 0.2–1.2)
BUN SERPL-MCNC: 8 MG/DL (ref 6–20)
CALCIUM SERPL-MCNC: 9.2 MG/DL (ref 8.6–10)
CHLORIDE SERPL-SCNC: 103 MMOL/L (ref 98–111)
CHOLEST SERPL-MCNC: 129 MG/DL (ref 0–199)
CO2 SERPL-SCNC: 26 MMOL/L (ref 22–29)
CREAT SERPL-MCNC: 0.9 MG/DL (ref 0.7–1.2)
EOSINOPHIL # BLD: 0.2 K/UL (ref 0–0.6)
EOSINOPHIL NFR BLD: 2.8 % (ref 0–5)
ERYTHROCYTE [DISTWIDTH] IN BLOOD BY AUTOMATED COUNT: 12.7 % (ref 11.5–14.5)
GLUCOSE SERPL-MCNC: 100 MG/DL (ref 70–99)
HCT VFR BLD AUTO: 47.4 % (ref 42–52)
HDLC SERPL-MCNC: 52 MG/DL (ref 40–60)
HGB BLD-MCNC: 15.4 G/DL (ref 14–18)
IMM GRANULOCYTES # BLD: 0 K/UL
LDLC SERPL CALC-MCNC: 63 MG/DL
LYMPHOCYTES # BLD: 2.1 K/UL (ref 1.1–4.5)
LYMPHOCYTES NFR BLD: 29.4 % (ref 20–40)
MCH RBC QN AUTO: 31.8 PG (ref 27–31)
MCHC RBC AUTO-ENTMCNC: 32.5 G/DL (ref 33–37)
MCV RBC AUTO: 97.9 FL (ref 80–94)
MONOCYTES # BLD: 0.7 K/UL (ref 0–0.9)
MONOCYTES NFR BLD: 10.1 % (ref 0–10)
NEUTROPHILS # BLD: 4 K/UL (ref 1.5–7.5)
NEUTS SEG NFR BLD: 57 % (ref 50–65)
PLATELET # BLD AUTO: 255 K/UL (ref 130–400)
PMV BLD AUTO: 10.1 FL (ref 9.4–12.4)
POTASSIUM SERPL-SCNC: 4.1 MMOL/L (ref 3.5–5)
PROT SERPL-MCNC: 7.2 G/DL (ref 6.4–8.3)
PSA SERPL-MCNC: 1.91 NG/ML (ref 0–4)
RBC # BLD AUTO: 4.84 M/UL (ref 4.7–6.1)
SODIUM SERPL-SCNC: 140 MMOL/L (ref 136–145)
TRIGL SERPL-MCNC: 70 MG/DL (ref 0–149)
WBC # BLD AUTO: 7 K/UL (ref 4.8–10.8)

## 2024-10-18 PROCEDURE — 99214 OFFICE O/P EST MOD 30 MIN: CPT | Performed by: FAMILY MEDICINE

## 2024-10-18 PROCEDURE — 3074F SYST BP LT 130 MM HG: CPT | Performed by: FAMILY MEDICINE

## 2024-10-18 PROCEDURE — 3078F DIAST BP <80 MM HG: CPT | Performed by: FAMILY MEDICINE

## 2024-10-18 RX ORDER — TEMAZEPAM 30 MG
30 CAPSULE ORAL NIGHTLY PRN
Qty: 30 CAPSULE | Refills: 5 | Status: SHIPPED | OUTPATIENT
Start: 2024-10-18 | End: 2024-11-17

## 2024-10-18 NOTE — PATIENT INSTRUCTIONS
Examine your lifestyle and the barriers to bad and good habits and how you can design your life to make better choices      Make it EASY to do the RIGHT THINGS.    1)  You can choose to Get 150 min/week of moderate exercise (can talk but can't sing) or 75 min/week of vigorous exercise (can't talk)   Examples: Walking, treadmill, bicycling, attending a gym.    2)  You can choose to Get 7-9 hours of sleep per night    Allow enough time each night to get adequate sleep.  Keep regular evening hours, same to bed and getting up every day.    3)  You can choose to Strength Train 2 x a week on non-consecutive days   Bodyweight exercises such push ups, sit ups, pilates or yoga   Purchase resistance bands to perform exercises   Utilize phone apps such as: [employer] or [email or web address]   Contact your local gym for a     4)  You can choose good nutrition.    Only eat your goal weight (in lbs) x 10 calories/day (Goal weight 150 lbs x 10 = 1500 calories per day)  Get 5 servings of Vegetables/day   Choose to eat a healthy diet such as the Mediterranean diet.    Plant based diets reduce risk of heart attack/stroke and will help you feel full on less food.    Avoid highly processed foods and processed carbohydrates.   Utilize apps to track your food:  Examples: Loseit, Myfitnesspal, or CalorieCounter by [employer]   Choose to follow a program.   Examples: Weight Watchers, Kamille Crisostomo, Rhina or Nutrisystem   Utilize apps identify and improve eating habits:   Examples: Eat Right Now, Noom    5)  You can choose to drink less alcohol: Drink less than 1-2 drinks/day no more than 7 drinks per week.    Alcohol will disrupt your sleep and add calories to your day    6)  You can choose to Practice Mindfulness and work on stress reduction.    Utilize apps such as Calm, Headspace, Abide  Contact local behavioral health specialists      Small changes every day will add up         Calcium-Score Screening Heart Scan    A

## 2024-10-18 NOTE — PROGRESS NOTES
10/18/24 88.9 kg (196 lb)   03/12/24 91.6 kg (202 lb)   12/12/23 95.3 kg (210 lb)           Physical Exam   Constitutional: He appears well. Does not appear ill.   Neck: Neck supple. No masses.  Neck Symmetric.  Normal tracheal position.  No thyroid enlargement  Cardiovascular: Normal rate and regular rhythm.  Exam reveals no friction rub.  No murmur heard.  Respiratory:  Effort normal and breath sounds normal. No respiratory distress. No wheezes. No rales. No use of accessory muscles or intercostal retractions.  Neurological: alert.   Psychiatric: normal mood and affect. His behavior is normal.       Physical Exam    Lab Results   Component Value Date    WBC 7.0 10/18/2024    HGB 15.4 10/18/2024    HCT 47.4 10/18/2024    MCV 97.9 (H) 10/18/2024     10/18/2024     Lab Results   Component Value Date    LABA1C 5.4 12/01/2022    LABA1C 5.4 04/28/2022     Lab Results   Component Value Date    CREATININE 0.9 10/18/2024      Lab Results   Component Value Date     10/18/2024    K 4.1 10/18/2024     10/18/2024    CO2 26 10/18/2024    BUN 8 10/18/2024    CREATININE 0.9 10/18/2024    GLUCOSE 100 (H) 10/18/2024    CALCIUM 9.2 10/18/2024    BILITOT 0.5 10/18/2024    ALKPHOS 63 10/18/2024    AST 32 10/18/2024    ALT 25 10/18/2024    LABGLOM >90 10/18/2024    GFRAA >59 04/28/2022      Lab review:    Hemoglobin hematocrit stable.  Platelets normal.  Renal function stable  Liver function still  Glucose 100  PSA 1.91  Total cholesterol 129  LDL 63          Assessment Plan:   1. Essential (primary) hypertension    2. Hypercholesterolemia    3. Chronic right shoulder pain    4. Chronic insomnia       1.  Lisinopril  Stable.   2.  Rosuvastatin   LDL at goal  3.  Meloxicam  Stable.   4.  Temazepam.   Stable.      Doses of medications listed below        If symptoms worsen or new symptoms develop,  return to clinic or go to ED.     No orders of the defined types were placed in this encounter.       Current Outpatient

## 2024-10-22 DIAGNOSIS — I10 ESSENTIAL (PRIMARY) HYPERTENSION: ICD-10-CM

## 2024-10-22 RX ORDER — LISINOPRIL 10 MG/1
10 TABLET ORAL DAILY
Qty: 90 TABLET | Refills: 1 | OUTPATIENT
Start: 2024-10-22

## 2024-10-22 RX ORDER — ROSUVASTATIN CALCIUM 5 MG/1
TABLET, COATED ORAL
Qty: 90 TABLET | Refills: 1 | OUTPATIENT
Start: 2024-10-22

## 2025-02-25 DIAGNOSIS — I10 ESSENTIAL (PRIMARY) HYPERTENSION: ICD-10-CM

## 2025-02-25 RX ORDER — LISINOPRIL 10 MG/1
10 TABLET ORAL DAILY
Qty: 90 TABLET | Refills: 1 | Status: SHIPPED | OUTPATIENT
Start: 2025-02-25

## 2025-02-25 RX ORDER — ROSUVASTATIN CALCIUM 5 MG/1
TABLET, COATED ORAL
Qty: 90 TABLET | Refills: 1 | Status: SHIPPED | OUTPATIENT
Start: 2025-02-25

## 2025-05-05 SDOH — ECONOMIC STABILITY: FOOD INSECURITY: WITHIN THE PAST 12 MONTHS, THE FOOD YOU BOUGHT JUST DIDN'T LAST AND YOU DIDN'T HAVE MONEY TO GET MORE.: NEVER TRUE

## 2025-05-05 SDOH — ECONOMIC STABILITY: INCOME INSECURITY: IN THE LAST 12 MONTHS, WAS THERE A TIME WHEN YOU WERE NOT ABLE TO PAY THE MORTGAGE OR RENT ON TIME?: NO

## 2025-05-05 SDOH — ECONOMIC STABILITY: FOOD INSECURITY: WITHIN THE PAST 12 MONTHS, YOU WORRIED THAT YOUR FOOD WOULD RUN OUT BEFORE YOU GOT MONEY TO BUY MORE.: NEVER TRUE

## 2025-05-05 SDOH — ECONOMIC STABILITY: TRANSPORTATION INSECURITY
IN THE PAST 12 MONTHS, HAS THE LACK OF TRANSPORTATION KEPT YOU FROM MEDICAL APPOINTMENTS OR FROM GETTING MEDICATIONS?: NO

## 2025-05-05 ASSESSMENT — PATIENT HEALTH QUESTIONNAIRE - PHQ9
SUM OF ALL RESPONSES TO PHQ QUESTIONS 1-9: 0
SUM OF ALL RESPONSES TO PHQ QUESTIONS 1-9: 0
1. LITTLE INTEREST OR PLEASURE IN DOING THINGS: NOT AT ALL
2. FEELING DOWN, DEPRESSED OR HOPELESS: NOT AT ALL
SUM OF ALL RESPONSES TO PHQ QUESTIONS 1-9: 0
1. LITTLE INTEREST OR PLEASURE IN DOING THINGS: NOT AT ALL
2. FEELING DOWN, DEPRESSED OR HOPELESS: NOT AT ALL
SUM OF ALL RESPONSES TO PHQ9 QUESTIONS 1 & 2: 0
SUM OF ALL RESPONSES TO PHQ QUESTIONS 1-9: 0

## 2025-05-05 NOTE — PROGRESS NOTES
[facility]  [address]  [address]  [place] [address]  Dept: [phone]  Dept Fax: [phone]  Loc: [phone]    Leighton Kennedy is a 60 y.o. male who presents today for his medical conditions/complaints as noted below.  Leighton Kennedy is here for 6 Month Follow-Up         Patient History:      Chronic right shoulder pain.  - MRI right shoulder October 2020: Full-thickness rotator cuff tear.  - November 2020: He has been evaluated by orthopedist, Dr. Colon, at the [facility].  - - He is not interested in surgical intervention at this time.  - Currently takes meloxicam 15 mg daily and hydrocodone 7.5 mg every 6 hours as needed 30 tablets/month        pthx        Subjective:   CC:  Here today to discuss the following:    Continues to have chronic right shoulder pain.  Has a history of rotator cuff tear.  He is requesting a refill of hydrocodone for as needed use.  His last prescription was over a year ago.  Continues to take meloxicam.  Requesting a refill    Hypertension  Compliant with medications.  No adverse effects from medication.  No lightheadedness, palpitations, or chest pain.      Hyperlipidemia  Tolerating current cholesterol medication without side effects. . Attempting to reduce processed sugar and cholesterol from diet.    Insomnia  Currently stable.  Satisfied with current treatment regimen.  No adverse effects from medication.         Review of Systems   Constitutional: Negative for chills and fever.   HENT: Negative for congestion.    Respiratory: Negative for cough, chest tightness and shortness of breath.  Cardiovascular: Negative for chest pain, palpitations and leg swelling.   Gastrointestinal: Negative for abdominal pain, anal bleeding, constipation, diarrhea and nausea.     Review of Systems  Objective:   /68   Pulse 77   Temp 97.6 °F (36.4 °C)   Ht 1.676 m (5' 6\")   Wt 93.4 kg (206 lb)   SpO2 98%   BMI 33.25

## 2025-05-06 ENCOUNTER — OFFICE VISIT (OUTPATIENT)
Dept: PRIMARY CARE CLINIC | Age: 60
End: 2025-05-06
Payer: COMMERCIAL

## 2025-05-06 VITALS
OXYGEN SATURATION: 98 % | WEIGHT: 206 LBS | HEIGHT: 66 IN | BODY MASS INDEX: 33.11 KG/M2 | DIASTOLIC BLOOD PRESSURE: 68 MMHG | HEART RATE: 77 BPM | TEMPERATURE: 97.6 F | SYSTOLIC BLOOD PRESSURE: 112 MMHG

## 2025-05-06 DIAGNOSIS — M72.2 PLANTAR FASCIITIS, BILATERAL: ICD-10-CM

## 2025-05-06 DIAGNOSIS — Z12.5 ENCOUNTER FOR PROSTATE CANCER SCREENING: ICD-10-CM

## 2025-05-06 DIAGNOSIS — E78.00 HYPERCHOLESTEROLEMIA: ICD-10-CM

## 2025-05-06 DIAGNOSIS — M48.02 CERVICAL SPINAL STENOSIS: ICD-10-CM

## 2025-05-06 DIAGNOSIS — Z79.899 HIGH RISK MEDICATION USE: ICD-10-CM

## 2025-05-06 DIAGNOSIS — I10 ESSENTIAL (PRIMARY) HYPERTENSION: Primary | ICD-10-CM

## 2025-05-06 DIAGNOSIS — G89.29 CHRONIC RIGHT SHOULDER PAIN: ICD-10-CM

## 2025-05-06 DIAGNOSIS — M25.511 CHRONIC RIGHT SHOULDER PAIN: ICD-10-CM

## 2025-05-06 LAB
ALCOHOL URINE: NORMAL
AMPHETAMINE SCREEN URINE: NORMAL
BARBITURATE SCREEN URINE: NORMAL
BENZODIAZEPINE SCREEN, URINE: NORMAL
BUPRENORPHINE URINE: NORMAL
COCAINE METABOLITE SCREEN URINE: NORMAL
FENTANYL SCREEN, URINE: NORMAL
GABAPENTIN SCREEN, URINE: NORMAL
MDMA, URINE: NORMAL
METHADONE SCREEN, URINE: NORMAL
METHAMPHETAMINE, URINE: NORMAL
OPIATE SCREEN URINE: NORMAL
OXYCODONE SCREEN URINE: NORMAL
PHENCYCLIDINE SCREEN URINE: NORMAL
PROPOXYPHENE SCREEN, URINE: NORMAL
SYNTHETIC CANNABINOIDS(K2) SCREEN, URINE: NORMAL
THC SCREEN, URINE: NORMAL
TRAMADOL SCREEN URINE: NORMAL
TRICYCLIC ANTIDEPRESSANTS, UR: NORMAL

## 2025-05-06 PROCEDURE — 99214 OFFICE O/P EST MOD 30 MIN: CPT | Performed by: FAMILY MEDICINE

## 2025-05-06 PROCEDURE — 80305 DRUG TEST PRSMV DIR OPT OBS: CPT | Performed by: FAMILY MEDICINE

## 2025-05-06 PROCEDURE — 3078F DIAST BP <80 MM HG: CPT | Performed by: FAMILY MEDICINE

## 2025-05-06 PROCEDURE — 3074F SYST BP LT 130 MM HG: CPT | Performed by: FAMILY MEDICINE

## 2025-05-06 RX ORDER — HYDROCODONE BITARTRATE AND ACETAMINOPHEN 7.5; 325 MG/1; MG/1
1 TABLET ORAL EVERY 6 HOURS PRN
Qty: 30 TABLET | Refills: 0 | Status: SHIPPED | OUTPATIENT
Start: 2025-05-06 | End: 2025-06-05

## 2025-05-06 RX ORDER — MELOXICAM 15 MG/1
15 TABLET ORAL DAILY
Qty: 90 TABLET | Refills: 1 | Status: SHIPPED | OUTPATIENT
Start: 2025-05-06

## 2025-05-06 NOTE — PATIENT INSTRUCTIONS
Labs before next appointment:  Go to room 405 for labs prior to next visit.   Be sure to fast for at least 10 hours prior to laboratory appointment.   Would recommend getting labs about 1 week prior to the appointment time to ensure they are available at the time of the appointment.   No appointment is necessary for laboratory work as the orders have already been placed.    Lab opens at 6:30 am and closes at 4:30 pm.

## 2025-06-03 DIAGNOSIS — G89.29 CHRONIC RIGHT SHOULDER PAIN: ICD-10-CM

## 2025-06-03 DIAGNOSIS — M25.511 CHRONIC RIGHT SHOULDER PAIN: ICD-10-CM

## 2025-06-04 DIAGNOSIS — I10 ESSENTIAL (PRIMARY) HYPERTENSION: ICD-10-CM

## 2025-06-04 RX ORDER — LISINOPRIL 10 MG/1
10 TABLET ORAL DAILY
Qty: 90 TABLET | Refills: 1 | Status: SHIPPED | OUTPATIENT
Start: 2025-06-04

## 2025-06-04 RX ORDER — ROSUVASTATIN CALCIUM 5 MG/1
TABLET, COATED ORAL
Qty: 90 TABLET | Refills: 1 | Status: SHIPPED | OUTPATIENT
Start: 2025-06-04

## 2025-06-04 NOTE — TELEPHONE ENCOUNTER
Leighton Kennedy called to request a refill on his medication.      Last office visit : 5/6/2025   Next office visit : 11/4/2025     Requested Prescriptions     Signed Prescriptions Disp Refills    lisinopril (PRINIVIL;ZESTRIL) 10 MG tablet 90 tablet 1     Sig: Take 1 tablet by mouth daily     Authorizing Provider: ALVINA SAMSON     Ordering User: ZAKI CRAWLEY    rosuvastatin (CRESTOR) 5 MG tablet 90 tablet 1     Sig: TAKE ONE TABLET BY MOUTH EVERY DAILY     Authorizing Provider: ALVINA SAMSON     Ordering User: ZAKI CRAWLEY LPN

## 2025-06-04 NOTE — TELEPHONE ENCOUNTER
Leighton Kennedy called to request a refill on his medication.      Last office visit : 5/6/2025   Next office visit : 11/4/2025     Last UDS:   Benzodiazepine Screen, Urine   Date Value Ref Range Status   05/06/2025 neg  Final     Buprenorphine Urine   Date Value Ref Range Status   05/06/2025 neg  Final     Cocaine Metabolite Screen, Urine   Date Value Ref Range Status   05/06/2025 neg  Final     Gabapentin Screen, Urine   Date Value Ref Range Status   05/06/2025 neg  Final     Oxycodone Screen, Ur   Date Value Ref Range Status   05/06/2025 neg  Final     Propoxyphene Screen, Urine   Date Value Ref Range Status   05/06/2025 neg  Final     THC Screen, Urine   Date Value Ref Range Status   05/06/2025 neg  Final     Tricyclic Antidepressants, Urine   Date Value Ref Range Status   05/06/2025 neg  Final       Last Josh: 6/4/25  Medication Contract: 5/6/25   Last Fill: 5/6/25    Requested Prescriptions     Pending Prescriptions Disp Refills    HYDROcodone-acetaminophen (NORCO) 7.5-325 MG per tablet 30 tablet 0     Sig: Take 1 tablet by mouth every 6 hours as needed for Pain for up to 30 days.         Please approve or refuse this medication.   Leelee Rosales MA

## 2025-06-05 RX ORDER — HYDROCODONE BITARTRATE AND ACETAMINOPHEN 7.5; 325 MG/1; MG/1
1 TABLET ORAL EVERY 6 HOURS PRN
Qty: 30 TABLET | Refills: 0 | Status: SHIPPED | OUTPATIENT
Start: 2025-06-05 | End: 2025-07-05

## 2025-07-02 DIAGNOSIS — M25.511 CHRONIC RIGHT SHOULDER PAIN: ICD-10-CM

## 2025-07-02 DIAGNOSIS — G89.29 CHRONIC RIGHT SHOULDER PAIN: ICD-10-CM

## 2025-07-03 RX ORDER — HYDROCODONE BITARTRATE AND ACETAMINOPHEN 7.5; 325 MG/1; MG/1
1 TABLET ORAL EVERY 6 HOURS PRN
Qty: 30 TABLET | Refills: 0 | Status: SHIPPED | OUTPATIENT
Start: 2025-07-03 | End: 2025-08-02

## 2025-07-03 NOTE — TELEPHONE ENCOUNTER
Provider needs to review PDMP    PDMP Monitoring:    Last PDMP Reji as Reviewed ([place]):  Review User Review Instant Review Result   DALILA RODRIGUEZ 5/30/2023  3:25 PM Reviewed PDMP [1]     Last filled date: 06/05/25  Last office visit for requested medication : 06/05/25    Josh: 06/04/25  Next office visit : 11/4/2025     Last UDS:   Benzodiazepine Screen, Urine   Date Value Ref Range Status   05/06/2025 neg  Final     Buprenorphine Urine   Date Value Ref Range Status   05/06/2025 neg  Final     Cocaine Metabolite Screen, Urine   Date Value Ref Range Status   05/06/2025 neg  Final     Gabapentin Screen, Urine   Date Value Ref Range Status   05/06/2025 neg  Final     Oxycodone Screen, Ur   Date Value Ref Range Status   05/06/2025 neg  Final     Propoxyphene Screen, Urine   Date Value Ref Range Status   05/06/2025 neg  Final     THC Screen, Urine   Date Value Ref Range Status   05/06/2025 neg  Final     Tricyclic Antidepressants, Urine   Date Value Ref Range Status   05/06/2025 neg  Final       Medication Contract and Consent for Opioid Use Documents Filed       Patient Documents       Type of Document Status Date Received Received By Description    Consent Opioid Use Received 12/12/2023  3:53 PM TATO RAUSCH opioid consent form    Medication Contract Received 5/6/2025 10:48 AM DAMIÁN JOHN Medication Contract                    Requested Prescriptions     Pending Prescriptions Disp Refills    HYDROcodone-acetaminophen (NORCO) 7.5-325 MG per tablet 30 tablet 0     Sig: Take 1 tablet by mouth every 6 hours as needed for Pain for up to 30 days.         Please approve or refuse this medication.   Laurie Singh LPN

## 2025-08-04 ENCOUNTER — PATIENT MESSAGE (OUTPATIENT)
Dept: PRIMARY CARE CLINIC | Age: 60
End: 2025-08-04

## 2025-08-04 DIAGNOSIS — M25.511 CHRONIC RIGHT SHOULDER PAIN: ICD-10-CM

## 2025-08-04 DIAGNOSIS — G89.29 CHRONIC RIGHT SHOULDER PAIN: ICD-10-CM

## 2025-08-04 RX ORDER — HYDROCODONE BITARTRATE AND ACETAMINOPHEN 7.5; 325 MG/1; MG/1
1 TABLET ORAL EVERY 6 HOURS PRN
Qty: 30 TABLET | Refills: 0 | Status: SHIPPED | OUTPATIENT
Start: 2025-08-04 | End: 2025-09-03

## 2025-09-02 DIAGNOSIS — M25.511 CHRONIC RIGHT SHOULDER PAIN: ICD-10-CM

## 2025-09-02 DIAGNOSIS — G89.29 CHRONIC RIGHT SHOULDER PAIN: ICD-10-CM

## 2025-09-03 RX ORDER — HYDROCODONE BITARTRATE AND ACETAMINOPHEN 7.5; 325 MG/1; MG/1
1 TABLET ORAL EVERY 6 HOURS PRN
Qty: 30 TABLET | Refills: 0 | Status: SHIPPED | OUTPATIENT
Start: 2025-09-03 | End: 2025-10-03

## (undated) DEVICE — ENDO KIT,LOURDES HOSPITAL: Brand: MEDLINE INDUSTRIES, INC.